# Patient Record
Sex: FEMALE | Race: WHITE | NOT HISPANIC OR LATINO | Employment: FULL TIME | URBAN - METROPOLITAN AREA
[De-identification: names, ages, dates, MRNs, and addresses within clinical notes are randomized per-mention and may not be internally consistent; named-entity substitution may affect disease eponyms.]

---

## 2017-10-10 ENCOUNTER — OFFICE VISIT (OUTPATIENT)
Dept: URGENT CARE | Facility: CLINIC | Age: 37
End: 2017-10-10
Payer: COMMERCIAL

## 2017-10-10 VITALS
DIASTOLIC BLOOD PRESSURE: 98 MMHG | SYSTOLIC BLOOD PRESSURE: 151 MMHG | OXYGEN SATURATION: 99 % | WEIGHT: 230 LBS | HEART RATE: 80 BPM | BODY MASS INDEX: 46.37 KG/M2 | TEMPERATURE: 97 F | HEIGHT: 59 IN

## 2017-10-10 DIAGNOSIS — H92.03 OTALGIA OF BOTH EARS: Primary | ICD-10-CM

## 2017-10-10 PROCEDURE — 99203 OFFICE O/P NEW LOW 30 MIN: CPT | Mod: S$GLB,,, | Performed by: EMERGENCY MEDICINE

## 2017-10-10 RX ORDER — ACETAMINOPHEN AND CODEINE PHOSPHATE 120; 12 MG/5ML; MG/5ML
1 SOLUTION ORAL DAILY
COMMUNITY

## 2017-10-10 NOTE — PROGRESS NOTES
"Subjective:       Patient ID: Malika Johnson is a 37 y.o. female.    Vitals:  height is 4' 11" (1.499 m) and weight is 104.3 kg (230 lb). Her oral temperature is 97.1 °F (36.2 °C). Her blood pressure is 151/98 (abnormal) and her pulse is 80. Her oxygen saturation is 99%.     Chief Complaint: Otalgia and Cough    Otalgia    There is pain in both ears. This is a new problem. The current episode started in the past 7 days. The problem occurs constantly. The problem has been waxing and waning. There has been no fever. Associated symptoms include coughing. Pertinent negatives include no abdominal pain, headaches or sore throat.   Cough   This is a new problem. The current episode started today. The problem has been unchanged. The problem occurs constantly. Associated symptoms include ear pain. Pertinent negatives include no chest pain, chills, eye redness, fever, headaches, myalgias, sore throat, shortness of breath or wheezing.     Review of Systems   Constitution: Negative for chills, fever and malaise/fatigue.   HENT: Positive for ear pain. Negative for congestion, hoarse voice and sore throat.    Eyes: Negative for discharge and redness.   Cardiovascular: Negative for chest pain, dyspnea on exertion and leg swelling.   Respiratory: Positive for cough. Negative for shortness of breath, sputum production and wheezing.    Musculoskeletal: Negative for myalgias.   Gastrointestinal: Negative for abdominal pain and nausea.   Neurological: Negative for headaches.       Objective:      Physical Exam   Constitutional: She is oriented to person, place, and time. She appears well-developed and well-nourished. She is cooperative.  Non-toxic appearance. She does not appear ill. No distress.   HENT:   Head: Normocephalic and atraumatic.   Right Ear: Hearing, tympanic membrane, external ear and ear canal normal.   Left Ear: Hearing, tympanic membrane, external ear and ear canal normal.   Nose: Nose normal. No mucosal edema, " rhinorrhea or nasal deformity. No epistaxis. Right sinus exhibits no maxillary sinus tenderness and no frontal sinus tenderness. Left sinus exhibits no maxillary sinus tenderness and no frontal sinus tenderness.   Mouth/Throat: Uvula is midline, oropharynx is clear and moist and mucous membranes are normal. No trismus in the jaw. Normal dentition. No uvula swelling. No posterior oropharyngeal erythema.   Eyes: Conjunctivae and lids are normal. No scleral icterus.   Sclera clear bilat   Neck: Trachea normal, full passive range of motion without pain and phonation normal. Neck supple.   Cardiovascular: Normal rate, regular rhythm, normal heart sounds, intact distal pulses and normal pulses.    Pulmonary/Chest: Effort normal and breath sounds normal. No respiratory distress.   Abdominal: Soft. Normal appearance and bowel sounds are normal. She exhibits no distension. There is no tenderness.   Musculoskeletal: Normal range of motion. She exhibits no edema or deformity.   Neurological: She is alert and oriented to person, place, and time. She exhibits normal muscle tone. Coordination normal.   Skin: Skin is warm, dry and intact. She is not diaphoretic. No pallor.   Psychiatric: She has a normal mood and affect. Her speech is normal and behavior is normal. Judgment and thought content normal. Cognition and memory are normal.   Nursing note and vitals reviewed.      Assessment:       1. Otalgia of both ears        Plan:         Otalgia of both ears

## 2017-10-10 NOTE — PATIENT INSTRUCTIONS
Please drink plenty of fluids.  Please get plenty of rest.  Please return here or go to the Emergency Department for any concerns or worsening of condition.  If you were given wait & see antibiotics, please wait 3-5 days before taking them, and only take them if your symptoms have worsened or not improved.  If you do begin taking the antibiotics, please take them to completion.  If you were prescribed antibiotics, please take them to completion.  If you were prescribed a narcotic medication, do not drive or operate heavy equipment or machinery while taking these medications.  Please taek over the counter Afrin as directed.  If you do not have Hypertension or any history of palpitations, it is ok to take over the counter Sudafed or Mucinex D or Allegra-D or Claritin-D or Zyrtec-D.  If you do take one of the above, it is ok to combine that with plain over the counter Mucinex or Allegra or Claritin or Zyrtec.  If for example you are taking Zyrtec -D, you can combine that with Mucinex, but not Mucinex-D.  If you are taking Mucinex-D, you can combine that with plain Allegra or Claritin or Zyrtec.   If you do have Hypertension or palpitations, it is safe to take Coricidin HBP for relief of sinus symptoms.  We recommend you take over the counter Flonase (Fluticasone) or another nasally inhaled steroid unless you are already taking one.  Nasal irrigation with a saline spray or Netti Pot like device per their directions is also recommended.  If not allergic, please take over the counter Tylenol (Acetaminophen) and/or Motrin (Ibuprofen) as directed for control of pain and/or fever.  Please follow up with your primary care doctor or specialist as needed.    If you  smoke, please stop smoking.  Earache, No Infection (Adult)  Earaches can happen without an infection. This occurs when air and fluid build up behind the eardrum causing a feeling of fullness and discomfort and reduced hearing. This is called otitis media with  effusion (OME) or serous otitis media. It means there is fluid in the middle ear. It is not the same as acute otitis media, which is typically from infection.  OME can happen when you have a cold if congestion blocks the passage that drains the middle ear. This passage is called the eustachian tube. OME may also occur with nasal allergies or after a bacterial middle ear infection.    The pain or discomfort may come and go. You may hear clicking or popping sounds when you chew or swallow. You may feel that your balance is off. Or you may hear ringing in the ear.  It often takes from several weeks up to 3 months for the fluid to clear on its own. Oral pain relievers and ear drops help if there is pain. Decongestants and antihistamines sometimes help. Antibiotics don't help since there is no infection. Your doctor may prescribe a nasal spray to help reduce swelling in the nose and eustachian tube. This can allow the ear to drain.  If your OME doesn't improve after 3 months, surgery may be used to drain the fluid and insert a small tube in the eardrum to allow continued drainage.  Because the middle ear fluid can become infected, it is important to watch for signs of an ear infection which may develop later. These signs include increased ear pain, fever, or drainage from the ear.  Home care  The following guidelines will help you care for yourself at home:  · You may use over-the-counter medicine as directed to control pain, unless another medicine was prescribed. If you have chronic liver or kidney disease or ever had a stomach ulcer or GI bleeding, talk with your doctor before using these medicines. Aspirin should never be used in anyone under 18 years of age who is ill with a fever. It may cause severe liver damage.  · You may use over-the-counter decongestants such as phenylephrine or pseudoephedrine. But they are not always helpful. Don't use nasal spray decongestants more than 3 days. Longer use can make  congestion worse. Prescription nasal sprays from your doctor don't typically have those restrictions.  · Antihistamines may help if you are also having allergy symptoms.  · You may use medicines such as guaifenesin to thin mucus and promote drainage.  Follow-up care  Follow up with your healthcare provider or as advised if you are not feeling better after 3 days.  When to seek medical advice  Call your healthcare provider right away if any of the following occur:  · Your ear pain gets worse or does not start to improve   · Fever of 100.4°F (38°C) or higher, or as directed by your healthcare provider  · Fluid or blood draining from the ear  · Headache or sinus pain  · Stiff neck  · Unusual drowsiness or confusion  Date Last Reviewed: 10/1/2016  © 9622-4963 [email or web address]. [address] [place] [address], [place] [address]. All rights reserved. This information is not intended as a substitute for professional medical care. Always follow your healthcare professional's instructions.

## 2018-03-28 ENCOUNTER — OFFICE VISIT (OUTPATIENT)
Dept: URGENT CARE | Facility: CLINIC | Age: 38
End: 2018-03-28
Payer: COMMERCIAL

## 2018-03-28 VITALS
WEIGHT: 230 LBS | DIASTOLIC BLOOD PRESSURE: 89 MMHG | HEIGHT: 59 IN | SYSTOLIC BLOOD PRESSURE: 138 MMHG | BODY MASS INDEX: 46.37 KG/M2 | HEART RATE: 79 BPM | RESPIRATION RATE: 16 BRPM | TEMPERATURE: 99 F | OXYGEN SATURATION: 97 %

## 2018-03-28 DIAGNOSIS — R19.7 DIARRHEA, UNSPECIFIED TYPE: Primary | ICD-10-CM

## 2018-03-28 PROCEDURE — 99214 OFFICE O/P EST MOD 30 MIN: CPT | Mod: S$GLB,,, | Performed by: FAMILY MEDICINE

## 2018-03-28 RX ORDER — DICYCLOMINE HYDROCHLORIDE 20 MG/1
20 TABLET ORAL
Qty: 20 TABLET | Refills: 0 | Status: SHIPPED | OUTPATIENT
Start: 2018-03-28 | End: 2018-04-02

## 2018-03-28 NOTE — LETTER
March 28, 2018      [facility] [address] [place] [address]  Phone: [phone]  Fax: [phone]       Patient: Malika Johnson   YOB: 1980  Date of Visit: 03/28/2018    To Whom It May Concern:    Milena Johnson  was at [facility] on 03/28/2018. She may return to work/school on 03/30/2018 with no restrictions. If you have any questions or concerns, or if I can be of further assistance, please do not hesitate to contact me.    Sincerely,      Bakari Singh MD

## 2018-03-28 NOTE — PROGRESS NOTES
"Subjective:       Patient ID: Malika Johnson is a 37 y.o. female.    Vitals:  height is 4' 11" (1.499 m) and weight is 104.3 kg (230 lb). Her temperature is 98.7 °F (37.1 °C). Her blood pressure is 138/89 and her pulse is 79. Her respiration is 16 and oxygen saturation is 97%.     Chief Complaint: Diarrhea    Diarrhea    The current episode started 1 to 4 weeks ago. The problem has been waxing and waning. The stool consistency is described as blood tinged and mucous. Pertinent negatives include no abdominal pain, chills or fever.     Review of Systems   Constitution: Negative for chills and fever.   Cardiovascular: Negative for chest pain.   Respiratory: Negative for shortness of breath.    Musculoskeletal: Negative for back pain.   Gastrointestinal: Positive for diarrhea and nausea. Negative for abdominal pain, constipation, hematochezia and melena.   Genitourinary: Negative for dysuria.       Objective:      Physical Exam   Constitutional: She is oriented to person, place, and time. She appears well-developed.   HENT:   Head: Normocephalic.   Nose: Nose normal.   Mouth/Throat: Oropharynx is clear and moist.   Eyes: Conjunctivae and EOM are normal. Pupils are equal, round, and reactive to light.   Cardiovascular: Normal rate and regular rhythm.    Pulmonary/Chest: Breath sounds normal.   Abdominal: Bowel sounds are increased. There is tenderness in the right lower quadrant, suprapubic area and left lower quadrant. There is no rigidity, no rebound, no guarding, no CVA tenderness, no tenderness at McBurney's point and negative Chau's sign.   Neurological: She is alert and oriented to person, place, and time.       Assessment:       1. Diarrhea, unspecified type        Plan:         Diarrhea, unspecified type  -     Stool culture; Future; Expected date: 03/28/2018  -     Occult blood x 1, stool  -     Stool Exam-Ova,Cysts,Parasites  -     WBC, Stool  -     Clostridium difficile EIA  -     dicyclomine (BENTYL) 20 " mg tablet; Take 1 tablet (20 mg total) by mouth 4 (four) times daily before meals and nightly.  Dispense: 20 tablet; Refill: 0      Patient Instructions     Uncertain Causes of Diarrhea (Adult)    Diarrhea is when stools are loose and watery. This can be caused by:  · Viral infections  · Bacterial infections  · Food poisoning  · Parasites  · Irritable bowel syndrome (IBS)  · Inflammatory bowel diseases such as ulcerative colitis, Crohn's disease, and celiac disease  · Food intolerance, such as to lactose, the sugar found in milk and milk products  · Reaction to medicines like antibiotics, laxatives, cancer drugs, and antacids  Along with diarrhea, you may also have:  · Abdominal pain and cramping  · Nausea and vomiting  · Loss of bowel control  · Fever and chills  · Bloody stools  In some cases, antibiotics may help to treat diarrhea. You may have a stool sample test. This is done to see what is causing your diarrhea, and if antibiotics will help treat it. The results of a stool sample test may take up to 2 days. The healthcare provider may not give you antibiotics until he or she has the stool test results.  Diarrhea can cause dehydration. This is the loss of too much water and other fluids from the body. When this occurs, body fluid must be replaced. This can be done with oral rehydration solutions. Oral rehydration solutions are available at drugstores and grocery stores without a prescription.  Home care  Follow all instructions given by your healthcare provider. Rest at home for the next 24 hours, or until you feel better. Avoid caffeine, tobacco, and alcohol. These can make diarrhea, cramping, and pain worse.  If taking medicines:  · Dont take over-the-counter diarrhea or nausea medicines unless your healthcare provider tells you to.  · You may use acetaminophen or NSAID medicines like ibuprofen or naproxen to reduce pain and fever. Dont use these if you have chronic liver or kidney disease, or ever had a  stomach ulcer or gastrointestinal bleeding. Don't use NSAID medicines if you are already taking one for another condition (like arthritis) or are on daily aspirin therapy (such as for heart disease or after a stroke). Talk with your healthcare provider first.  · If antibiotics were prescribed, be sure you take them until they are finished. Dont stop taking them even when you feel better. Antibiotics must be taken as a full course.  To prevent the spread of illness:  · Remember that washing with soap and water and using alcohol-based  is the best way to prevent the spread of infection.  · Clean the toilet after each use.  · Wash your hands before eating.  · Wash your hands before and after preparing food. Keep in mind that people with diarrhea or vomiting should not prepare food for others.  · Wash your hands after using cutting boards, countertops, and knives that have been in contact with raw foods.  · Wash and then peel fruits and vegetables.  · Keep uncooked meats away from cooked and ready-to-eat foods.  · Use a food thermometer when cooking. Cook poultry to at least 165°F (74°C). Cook ground meat (beef, veal, pork, lamb) to at least 160°F (71°C). Cook fresh beef, veal, lamb, and pork to at least 145°F (63°C).  · Dont eat raw or undercooked eggs (poached or chasidy side up), poultry, meat, or unpasteurized milk and juices.  Food and drinks  The main goal while treating vomiting or diarrhea is to prevent dehydration. This is done by taking small amounts of liquids often.  · Keep in mind that liquids are more important than food right now.  · Drink only small amounts of liquids at a time.  · Dont force yourself to eat, especially if you are having cramping, vomiting, or diarrhea. Dont eat large amounts at a time, even if you are hungry.  · If you eat, avoid fatty, greasy, spicy, or fried foods.  · Dont eat dairy foods or drink milk if you have diarrhea. These can make diarrhea worse.  During the first  24 hours you can try:  · Oral rehydration solutions. Do not use sports drinks. They have too much sugar and not enough electrolytes.  · Soft drinks without caffeine  · Ginger ale  · Water (plain or flavored)  · Decaf tea or coffee  · Clear broth, consommé, or bouillon  · Gelatin, popsicles, or frozen fruit juice bars  The second 24 hours, if you are feeling better, you can add:  · Hot cereal, plain toast, bread, rolls, or crackers  · Plain noodles, rice, mashed potatoes, chicken noodle soup, or rice soup  · Unsweetened canned fruit (no pineapple)  · Bananas  As you recover:  · Limit fat intake to less than 15 grams per day. Dont eat margarine, butter, oils, mayonnaise, sauces, gravies, fried foods, peanut butter, meat, poultry, or fish.  · Limit fiber. Dont eat raw or cooked vegetables, fresh fruits except bananas, or bran cereals.  · Limit caffeine and chocolate.  · Limit dairy.  · Dont use spices or seasonings except salt.  · Go back to your normal diet over time, as you feel better and your symptoms improve.  · If the symptoms come back, go back to a simple diet or clear liquids.  Follow-up care  Follow up with your healthcare provider, or as advised. If a stool sample was taken or cultures were done, call the healthcare provider for the results as instructed.  Call 911  Call 911 if you have any of these symptoms:  · Trouble breathing  · Confusion  · Extreme drowsiness or trouble walking  · Loss of consciousness  · Rapid heart rate  · Chest pain  · Stiff neck  · Seizure  When to seek medical advice  Call your healthcare provider right away if any of these occur:  · Abdominal pain that gets worse  · Constant lower right abdominal pain  · Continued vomiting and inability to keep liquids down  · Diarrhea more than 5 times a day  · Blood in vomit or stool  · Dark urine or no urine for 8 hours, dry mouth and tongue, tiredness, weakness, or dizziness  · Drowsiness  · New rash  · You dont get better in 2 to 3  days  · Fever of 100.4°F (38°C) or higher that doesnt get lower with medicine  Date Last Reviewed: 1/3/2016  © 5068-6335 [employer]. [address] [place] [address], [place] [address]. All rights reserved. This information is not intended as a substitute for professional medical care. Always follow your healthcare professional's instructions.

## 2018-03-28 NOTE — PATIENT INSTRUCTIONS

## 2018-04-02 LAB
BACTERIA SPEC CULT: NORMAL
BACTERIA SPEC CULT: NORMAL
CAMPYLOBACTER STL CULT: NORMAL
E COLI SXT STL QL IA: NEGATIVE
SALM + SHIG STL CULT: NORMAL

## 2018-04-04 ENCOUNTER — TELEPHONE (OUTPATIENT)
Dept: URGENT CARE | Facility: CLINIC | Age: 38
End: 2018-04-04

## 2018-04-04 LAB
C DIFF TOX GENS STL QL NAA+PROBE: NEGATIVE
HEMOCCULT STL QL IA: NEGATIVE
O+P SPEC MICRO: NORMAL
O+P STL CONC: NORMAL
WBC STL QL MICRO: NORMAL
WBC STL QL MICRO: NORMAL

## 2018-04-04 NOTE — TELEPHONE ENCOUNTER
Patient results discussed with patient. She reports understanding. Advised follow up with PCP if needed.    ----- Message from Bakari Singh MD sent at 4/4/2018  9:01 AM CDT -----  Please notify patient that labs were within normal limits with no significant abnormalities on stool culture. Advise patient to follow up with PCP (or specialist)  as directed if symptoms persist.

## 2018-04-05 ENCOUNTER — TELEPHONE (OUTPATIENT)
Dept: URGENT CARE | Facility: CLINIC | Age: 38
End: 2018-04-05

## 2018-08-19 ENCOUNTER — HOSPITAL ENCOUNTER (EMERGENCY)
Facility: HOSPITAL | Age: 38
Discharge: HOME | End: 2018-08-19
Attending: EMERGENCY MEDICINE
Payer: COMMERCIAL

## 2018-08-19 VITALS
HEIGHT: 59 IN | RESPIRATION RATE: 16 BRPM | SYSTOLIC BLOOD PRESSURE: 145 MMHG | TEMPERATURE: 99 F | BODY MASS INDEX: 46.37 KG/M2 | OXYGEN SATURATION: 97 % | HEART RATE: 85 BPM | DIASTOLIC BLOOD PRESSURE: 97 MMHG | WEIGHT: 230 LBS

## 2018-08-19 DIAGNOSIS — K62.5 RECTAL BLEEDING: Primary | ICD-10-CM

## 2018-08-19 LAB
ALBUMIN SERPL-MCNC: 3.3 G/DL (ref 3.3–5.5)
ALP SERPL-CCNC: 78 U/L (ref 42–141)
B-HCG UR QL: NEGATIVE
BILIRUB SERPL-MCNC: 0.5 MG/DL (ref 0.2–1.6)
BILIRUBIN, POC UA: NEGATIVE
BLOOD, POC UA: ABNORMAL
BUN SERPL-MCNC: 11 MG/DL (ref 7–22)
CALCIUM SERPL-MCNC: 8.8 MG/DL (ref 8–10.3)
CHLORIDE SERPL-SCNC: 105 MMOL/L (ref 98–108)
CLARITY, POC UA: CLEAR
COLOR, POC UA: YELLOW
CREAT SERPL-MCNC: 0.6 MG/DL (ref 0.6–1.2)
CTP QC/QA: YES
CTP QC/QA: YES
FECAL OCCULT BLOOD, POC: NEGATIVE
GLUCOSE SERPL-MCNC: 127 MG/DL (ref 73–118)
GLUCOSE, POC UA: NEGATIVE
KETONES, POC UA: NEGATIVE
LEUKOCYTE EST, POC UA: NEGATIVE
NITRITE, POC UA: NEGATIVE
PH UR STRIP: 6 [PH]
POC ALT (SGPT): 40 U/L (ref 10–47)
POC AST (SGOT): 32 U/L (ref 11–38)
POC TCO2: 25 MMOL/L (ref 18–33)
POTASSIUM BLD-SCNC: 3.5 MMOL/L (ref 3.6–5.1)
PROTEIN, POC UA: NEGATIVE
PROTEIN, POC: 7.5 G/DL (ref 6.4–8.1)
SODIUM BLD-SCNC: 142 MMOL/L (ref 128–145)
SPECIFIC GRAVITY, POC UA: 1.01
UROBILINOGEN, POC UA: 0.2 E.U./DL

## 2018-08-19 PROCEDURE — 99283 EMERGENCY DEPT VISIT LOW MDM: CPT | Mod: 25

## 2018-08-19 PROCEDURE — 81025 URINE PREGNANCY TEST: CPT | Performed by: EMERGENCY MEDICINE

## 2018-08-19 PROCEDURE — 80053 COMPREHEN METABOLIC PANEL: CPT

## 2018-08-19 PROCEDURE — 85025 COMPLETE CBC W/AUTO DIFF WBC: CPT

## 2018-08-19 PROCEDURE — 81003 URINALYSIS AUTO W/O SCOPE: CPT

## 2018-08-19 PROCEDURE — 82270 OCCULT BLOOD FECES: CPT

## 2018-08-19 NOTE — DISCHARGE INSTRUCTIONS
Contact  the [facility] referral line at [phone] for help establishing a primary care provider for health maintenance and follow-up.  You will likely need to be evaluated by a gastroenterologist with a endoscopic procedure.    Drink plenty of liquids to keep stool soft.    Rash the next 1-2 days as needed.    Continue to eat a high-fiber diet.    Return as needed for worsening condition.

## 2018-08-19 NOTE — ED PROVIDER NOTES
Encounter Date: 2018       History     Chief Complaint   Patient presents with    Rectal Bleeding     pt reports she noticed bright red blood in stool this morning. pt denies abdominal pain, N/V/D.      38 year old female reports she had bright red blood per rectum this morning.  She also reports she also had an episode Th.            Review of patient's allergies indicates:   Allergen Reactions    Amoxicillin      Past Medical History:   Diagnosis Date    Graves disease     Mastitis     left breast     Past Surgical History:   Procedure Laterality Date    APPENDECTOMY      BREAST SURGERY  2003    left breast     SECTION      x1    THYROIDECTOMY       History reviewed. No pertinent family history.  Social History     Tobacco Use    Smoking status: Never Smoker    Smokeless tobacco: Never Used   Substance Use Topics    Alcohol use: Yes    Drug use: No     Review of Systems   Constitutional: Positive for fatigue. Negative for chills and fever.   HENT: Negative for rhinorrhea and sore throat.    Respiratory: Negative for cough and shortness of breath.    Cardiovascular: Negative for chest pain and leg swelling.   Gastrointestinal: Positive for blood in stool, diarrhea (three episodes Thursday) and nausea. Negative for abdominal pain, rectal pain and vomiting.   Genitourinary: Positive for hematuria. Negative for dysuria and frequency.   Musculoskeletal: Negative for back pain and neck pain.   Skin: Negative for rash and wound.   Neurological: Negative for weakness and numbness (  ).       Physical Exam     Initial Vitals [18 1321]   BP Pulse Resp Temp SpO2   (!) 140/82 102 18 98.6 °F (37 °C) 98 %      MAP       --         Physical Exam    Nursing note and vitals reviewed.  Constitutional: Vital signs are normal. She appears well-developed and well-nourished. She is cooperative.   HENT:   Head: Normocephalic and atraumatic.   Neck: Phonation normal. Neck supple. No spinous process  tenderness present.   Cardiovascular: Normal rate, regular rhythm and normal heart sounds.   Pulses:       Radial pulses are 2+ on the right side, and 2+ on the left side.   No pedal edema bilateral lower extremities   Pulmonary/Chest: Effort normal and breath sounds normal.   Abdominal: Soft. Bowel sounds are normal. There is no tenderness. There is no guarding.   Genitourinary: Rectum normal. Rectal exam shows no external hemorrhoid, no fissure, no tenderness and guaiac negative stool. Guaiac negative stool. Pelvic exam was performed with patient in the knee-chest position.   Neurological: She is alert and oriented to person, place, and time. Gait normal.         ED Course   Procedures  Labs Reviewed   POCT URINALYSIS W/O SCOPE - Abnormal; Notable for the following components:       Result Value    Glucose, UA Negative (*)     Bilirubin, UA Negative (*)     Ketones, UA Negative (*)     Blood, UA Trace-lysed (*)     Protein, UA Negative (*)     Nitrite, UA Negative (*)     Leukocytes, UA Negative (*)     All other components within normal limits   POCT URINE PREGNANCY   POCT URINALYSIS W/O SCOPE   POCT CBC   POCT CMP          Imaging Results    None                               Clinical Impression:   The encounter diagnosis was Rectal bleeding.                            Loida Curry MD  08/19/18 [id number]

## 2018-08-19 NOTE — ED NOTES
Pt states she was on her period but finished and then she noticed bright red blood when she wiped herself and thought it was still her period but the blood was coming from her rectum. Pt stated there was more blood today then yesterday. She states she does note have a problem with hemorrhoids and has never had rectal bleeding.

## 2018-08-20 ENCOUNTER — TELEPHONE (OUTPATIENT)
Dept: SURGERY | Facility: CLINIC | Age: 38
End: 2018-08-20

## 2018-08-20 NOTE — TELEPHONE ENCOUNTER
----- Message from Marguerite Tsang sent at 8/20/2018  8:39 AM CDT -----  Contact: self  Pt is calling in regards to scheduling a new pt appt. Per pt, she had blood in her stool. The first available appt is 10/22. Pt would like to be seen sooner.    Pt would like a call back at [phone].    Thank you

## 2018-08-21 ENCOUNTER — OFFICE VISIT (OUTPATIENT)
Dept: SURGERY | Facility: CLINIC | Age: 38
End: 2018-08-21
Payer: COMMERCIAL

## 2018-08-21 VITALS
BODY MASS INDEX: 50.08 KG/M2 | HEART RATE: 85 BPM | DIASTOLIC BLOOD PRESSURE: 90 MMHG | SYSTOLIC BLOOD PRESSURE: 142 MMHG | WEIGHT: 248.44 LBS | HEIGHT: 59 IN

## 2018-08-21 DIAGNOSIS — K64.8 BLEEDING INTERNAL HEMORRHOIDS: Primary | ICD-10-CM

## 2018-08-21 PROCEDURE — 99999 PR PBB SHADOW E&M-EST. PATIENT-LVL III: CPT | Mod: PBBFAC,,, | Performed by: NURSE PRACTITIONER

## 2018-08-21 PROCEDURE — 99204 OFFICE O/P NEW MOD 45 MIN: CPT | Mod: 25,S$GLB,, | Performed by: NURSE PRACTITIONER

## 2018-08-21 PROCEDURE — 3008F BODY MASS INDEX DOCD: CPT | Mod: CPTII,S$GLB,, | Performed by: NURSE PRACTITIONER

## 2018-08-21 PROCEDURE — 46600 DIAGNOSTIC ANOSCOPY SPX: CPT | Mod: S$GLB,,, | Performed by: NURSE PRACTITIONER

## 2018-08-21 RX ORDER — HYDROCORTISONE ACETATE 25 MG/1
25 SUPPOSITORY RECTAL 2 TIMES DAILY
Qty: 20 SUPPOSITORY | Refills: 0 | Status: SHIPPED | OUTPATIENT
Start: 2018-08-21 | End: 2018-08-31

## 2018-08-21 NOTE — PROGRESS NOTES
Subjective:       Patient ID: Malika Johnson is a 38 y.o. female.    Chief Complaint: Rectal Bleeding    HPI   38 F presents to clinic for rectal bleeding for the past 5 days. Bright red blood with BMs, in toilet bowel and on toilet paper. She denies any rectal pain, abdominal pain, unexplained weight loss or change in bowel habits. She has a soft BM 1-2x/day.     Prior colonoscopy: none  Family history of colon or rectal CA: none  Prior rectal surgeries: none    Review of Systems   Constitutional: Negative for fatigue, fever and unexpected weight change.   Respiratory: Negative for shortness of breath.    Cardiovascular: Negative for chest pain.   Gastrointestinal: Positive for blood in stool. Negative for abdominal distention, abdominal pain, anal bleeding, constipation, diarrhea, nausea, rectal pain and vomiting.       Objective:      Physical Exam   Constitutional: She is oriented to person, place, and time. She appears well-developed and well-nourished. No distress.   Eyes: Conjunctivae and EOM are normal.   Pulmonary/Chest: Effort normal. No respiratory distress.   Abdominal: Soft. She exhibits no distension. There is no tenderness.   Genitourinary:   Genitourinary Comments: Normal perianal skin. eversion of anus revealed no abnormality or fissure, CHARAN revealed no masses, blood or stool in vault, normal sphincter tone, anoscopy revealed Grade III internal hemorrhoids with a small amount of bleeding     Musculoskeletal: Normal range of motion.   Neurological: She is alert and oriented to person, place, and time.   Skin: Skin is warm and dry.   Psychiatric: She has a normal mood and affect. Her behavior is normal.       Assessment:       1. Bleeding internal hemorrhoids        Plan:       Anusuol suppositories  High fiber diet   Metamucil  RTC 4 weeks

## 2020-06-17 ENCOUNTER — PATIENT OUTREACH (OUTPATIENT)
Dept: ADMINISTRATIVE | Facility: HOSPITAL | Age: 40
End: 2020-06-17

## 2020-07-01 ENCOUNTER — OFFICE VISIT (OUTPATIENT)
Dept: FAMILY MEDICINE | Facility: CLINIC | Age: 40
End: 2020-07-01
Payer: COMMERCIAL

## 2020-07-01 VITALS
TEMPERATURE: 97 F | HEIGHT: 59 IN | SYSTOLIC BLOOD PRESSURE: 136 MMHG | WEIGHT: 262.25 LBS | OXYGEN SATURATION: 98 % | BODY MASS INDEX: 52.87 KG/M2 | HEART RATE: 104 BPM | DIASTOLIC BLOOD PRESSURE: 86 MMHG

## 2020-07-01 DIAGNOSIS — E05.00 GRAVES DISEASE: ICD-10-CM

## 2020-07-01 DIAGNOSIS — Z00.00 ROUTINE GENERAL MEDICAL EXAMINATION AT A HEALTH CARE FACILITY: Primary | ICD-10-CM

## 2020-07-01 PROCEDURE — 99385 PR PREVENTIVE VISIT,NEW,18-39: ICD-10-PCS | Mod: S$GLB,,, | Performed by: FAMILY MEDICINE

## 2020-07-01 PROCEDURE — 99999 PR PBB SHADOW E&M-EST. PATIENT-LVL III: CPT | Mod: PBBFAC,,, | Performed by: FAMILY MEDICINE

## 2020-07-01 PROCEDURE — 99999 PR PBB SHADOW E&M-EST. PATIENT-LVL III: ICD-10-PCS | Mod: PBBFAC,,, | Performed by: FAMILY MEDICINE

## 2020-07-01 PROCEDURE — 99385 PREV VISIT NEW AGE 18-39: CPT | Mod: S$GLB,,, | Performed by: FAMILY MEDICINE

## 2020-07-01 NOTE — PROGRESS NOTES
"Chief Complaint   Patient presents with    Establish Care    Annual Exam       HPI  Malika Johnson is a 39 y.o. female with multiple medical diagnoses as listed in the medical history and problem list that presents for establishment of care and for annual exam    She has concerns about her hx of graves disease for which her thyroid levels have been monitored but not recently. She is not having any symptoms besides some weight gain but attributes this to decreased exercise since the pandemic and increased appetite but she is trying to adjust this. Periods are regular on an OCP, has f/u with GYN for her pap smear    HPI    Patient Active Problem List   Diagnosis    Graves disease         ROS  Review of Systems   Constitutional: Negative for chills, diaphoresis, fatigue, fever and unexpected weight change.   HENT: Negative for rhinorrhea, sinus pressure, sore throat and tinnitus.    Eyes: Negative for photophobia and visual disturbance.   Respiratory: Negative for cough, shortness of breath and wheezing.    Cardiovascular: Negative for chest pain and palpitations.   Gastrointestinal: Negative for abdominal pain, blood in stool, constipation, diarrhea, nausea and vomiting.   Genitourinary: Negative for dysuria, flank pain, frequency and vaginal discharge.   Musculoskeletal: Negative for arthralgias and joint swelling.   Skin: Negative for rash.   Neurological: Negative for speech difficulty, weakness, light-headedness and headaches.   Psychiatric/Behavioral: Negative for behavioral problems and dysphoric mood.       Physical Exam  Vitals:    07/01/20 0821   BP: 136/86   BP Location: Right arm   Patient Position: Sitting   BP Method: Large (Manual)   Pulse: 104   Temp: 97.3 °F (36.3 °C)   TempSrc: Temporal   SpO2: 98%   Weight: 118.9 kg (262 lb 3.8 oz)   Height: 4' 11" (1.499 m)    Body mass index is 52.97 kg/m².  Weight: 118.9 kg (262 lb 3.8 oz)   Height: 4' 11" (149.9 cm)     Physical Exam  Vitals signs and " nursing note reviewed.   Constitutional:       General: She is not in acute distress.     Appearance: She is well-developed.   HENT:      Head: Normocephalic and atraumatic.      Right Ear: Tympanic membrane normal.      Left Ear: Tympanic membrane normal.      Nose: Nose normal.      Mouth/Throat:      Pharynx: No oropharyngeal exudate.   Neck:      Musculoskeletal: Neck supple.      Thyroid: No thyromegaly.   Cardiovascular:      Rate and Rhythm: Normal rate and regular rhythm.      Heart sounds: No murmur. No friction rub. No gallop.    Pulmonary:      Effort: Pulmonary effort is normal. No respiratory distress.      Breath sounds: Normal breath sounds. No wheezing or rales.   Abdominal:      General: Bowel sounds are normal. There is no distension.      Palpations: Abdomen is soft. There is no mass.      Tenderness: There is no abdominal tenderness. There is no guarding or rebound.   Lymphadenopathy:      Cervical: No cervical adenopathy.   Skin:     General: Skin is warm and dry.      Findings: No rash.   Neurological:      Mental Status: She is alert and oriented to person, place, and time.   Psychiatric:         Behavior: Behavior normal.         ALLERGIES AND MEDICATIONS: updated and reviewed.  Review of patient's allergies indicates:   Allergen Reactions    Amoxicillin Hives     Medication List with Changes/Refills   Current Medications    CALCIUM ORAL    Take by mouth.    FERROUS SULFATE (IRON ORAL)    Take by mouth.    MULTIVIT-MIN-FERROUS SULFATE 15 MG IRON TAB    Take by mouth.    MULTIVITAMIN ORAL    Take by mouth.    NORETHINDRONE (MICRONOR) 0.35 MG TABLET    Take 1 tablet by mouth once daily.       Assessment & Plan  1. Routine general medical examination at a health care facility    2. BMI 50.0-59.9, adult    3. Graves disease        Problem List Items Addressed This Visit        Endocrine    Graves disease  -will monitor thyroid levels      Other Visit Diagnoses     Routine general medical  examination at a health care facility    -  Primary  --discussed healthy lifestyle modification with exercise and healthy diet, reviewed age appropriate screening and healthy maintenance      Relevant Orders    CBC auto differential    Comprehensive metabolic panel    Lipid Panel    Hemoglobin A1C    TSH    T4, free    BMI 50.0-59.9, adult      -discussed increasing exercise and dietary modification for weight loss          Follow up in about 1 year (around 7/1/2021) for annual exam.    Other Orders Placed This Visit:  Orders Placed This Encounter   Procedures    CBC auto differential    Comprehensive metabolic panel    Lipid Panel    Hemoglobin A1C    TSH    T4, free

## 2020-07-02 ENCOUNTER — LAB VISIT (OUTPATIENT)
Dept: LAB | Facility: HOSPITAL | Age: 40
End: 2020-07-02
Attending: FAMILY MEDICINE
Payer: COMMERCIAL

## 2020-07-02 DIAGNOSIS — Z00.00 ROUTINE GENERAL MEDICAL EXAMINATION AT A HEALTH CARE FACILITY: ICD-10-CM

## 2020-07-02 LAB
ALBUMIN SERPL BCP-MCNC: 3.7 G/DL (ref 3.5–5.2)
ALP SERPL-CCNC: 87 U/L (ref 55–135)
ALT SERPL W/O P-5'-P-CCNC: 35 U/L (ref 10–44)
ANION GAP SERPL CALC-SCNC: 12 MMOL/L (ref 8–16)
AST SERPL-CCNC: 25 U/L (ref 10–40)
BASOPHILS # BLD AUTO: 0.08 K/UL (ref 0–0.2)
BASOPHILS NFR BLD: 0.7 % (ref 0–1.9)
BILIRUB SERPL-MCNC: 0.3 MG/DL (ref 0.1–1)
BUN SERPL-MCNC: 14 MG/DL (ref 6–20)
CALCIUM SERPL-MCNC: 9.1 MG/DL (ref 8.7–10.5)
CHLORIDE SERPL-SCNC: 104 MMOL/L (ref 95–110)
CHOLEST SERPL-MCNC: 176 MG/DL (ref 120–199)
CHOLEST/HDLC SERPL: 3.2 {RATIO} (ref 2–5)
CO2 SERPL-SCNC: 21 MMOL/L (ref 23–29)
CREAT SERPL-MCNC: 0.7 MG/DL (ref 0.5–1.4)
DIFFERENTIAL METHOD: ABNORMAL
EOSINOPHIL # BLD AUTO: 0.2 K/UL (ref 0–0.5)
EOSINOPHIL NFR BLD: 1.8 % (ref 0–8)
ERYTHROCYTE [DISTWIDTH] IN BLOOD BY AUTOMATED COUNT: 13.4 % (ref 11.5–14.5)
EST. GFR  (AFRICAN AMERICAN): >60 ML/MIN/1.73 M^2
EST. GFR  (NON AFRICAN AMERICAN): >60 ML/MIN/1.73 M^2
ESTIMATED AVG GLUCOSE: 108 MG/DL (ref 68–131)
GLUCOSE SERPL-MCNC: 96 MG/DL (ref 70–110)
HBA1C MFR BLD HPLC: 5.4 % (ref 4–5.6)
HCT VFR BLD AUTO: 40.2 % (ref 37–48.5)
HDLC SERPL-MCNC: 55 MG/DL (ref 40–75)
HDLC SERPL: 31.3 % (ref 20–50)
HGB BLD-MCNC: 12.5 G/DL (ref 12–16)
IMM GRANULOCYTES # BLD AUTO: 0.05 K/UL (ref 0–0.04)
IMM GRANULOCYTES NFR BLD AUTO: 0.4 % (ref 0–0.5)
LDLC SERPL CALC-MCNC: 88 MG/DL (ref 63–159)
LYMPHOCYTES # BLD AUTO: 3 K/UL (ref 1–4.8)
LYMPHOCYTES NFR BLD: 26.2 % (ref 18–48)
MCH RBC QN AUTO: 28.9 PG (ref 27–31)
MCHC RBC AUTO-ENTMCNC: 31.1 G/DL (ref 32–36)
MCV RBC AUTO: 93 FL (ref 82–98)
MONOCYTES # BLD AUTO: 0.9 K/UL (ref 0.3–1)
MONOCYTES NFR BLD: 7.4 % (ref 4–15)
NEUTROPHILS # BLD AUTO: 7.3 K/UL (ref 1.8–7.7)
NEUTROPHILS NFR BLD: 63.5 % (ref 38–73)
NONHDLC SERPL-MCNC: 121 MG/DL
NRBC BLD-RTO: 0 /100 WBC
PLATELET # BLD AUTO: 406 K/UL (ref 150–350)
PMV BLD AUTO: 10.7 FL (ref 9.2–12.9)
POTASSIUM SERPL-SCNC: 4.3 MMOL/L (ref 3.5–5.1)
PROT SERPL-MCNC: 7.9 G/DL (ref 6–8.4)
RBC # BLD AUTO: 4.33 M/UL (ref 4–5.4)
SODIUM SERPL-SCNC: 137 MMOL/L (ref 136–145)
T4 FREE SERPL-MCNC: 0.91 NG/DL (ref 0.71–1.51)
TRIGL SERPL-MCNC: 165 MG/DL (ref 30–150)
TSH SERPL DL<=0.005 MIU/L-ACNC: 0.56 UIU/ML (ref 0.4–4)
WBC # BLD AUTO: 11.42 K/UL (ref 3.9–12.7)

## 2020-07-02 PROCEDURE — 80061 LIPID PANEL: CPT

## 2020-07-02 PROCEDURE — 85025 COMPLETE CBC W/AUTO DIFF WBC: CPT

## 2020-07-02 PROCEDURE — 80053 COMPREHEN METABOLIC PANEL: CPT

## 2020-07-02 PROCEDURE — 84439 ASSAY OF FREE THYROXINE: CPT

## 2020-07-02 PROCEDURE — 36415 COLL VENOUS BLD VENIPUNCTURE: CPT | Mod: PO

## 2020-07-02 PROCEDURE — 83036 HEMOGLOBIN GLYCOSYLATED A1C: CPT

## 2020-07-02 PROCEDURE — 84443 ASSAY THYROID STIM HORMONE: CPT

## 2020-08-21 DIAGNOSIS — Z11.59 NEED FOR HEPATITIS C SCREENING TEST: ICD-10-CM

## 2020-08-21 DIAGNOSIS — Z12.39 BREAST CANCER SCREENING: ICD-10-CM

## 2021-01-04 ENCOUNTER — PATIENT MESSAGE (OUTPATIENT)
Dept: ADMINISTRATIVE | Facility: HOSPITAL | Age: 41
End: 2021-01-04

## 2021-01-15 ENCOUNTER — OCCUPATIONAL HEALTH (OUTPATIENT)
Dept: URGENT CARE | Facility: CLINIC | Age: 41
End: 2021-01-15
Payer: COMMERCIAL

## 2021-01-15 DIAGNOSIS — Z20.822 COVID-19 RULED OUT: Primary | ICD-10-CM

## 2021-01-15 LAB
CTP QC/QA: YES
SARS-COV-2 RDRP RESP QL NAA+PROBE: NEGATIVE

## 2021-01-15 PROCEDURE — 99199 UNLISTED SPECIAL SVC PX/RPRT: CPT | Mod: S$GLB,,, | Performed by: PHYSICIAN ASSISTANT

## 2021-01-15 PROCEDURE — 99199 CV19 SPECIMEN HANDLING FEE / SWAB: ICD-10-PCS | Mod: S$GLB,,, | Performed by: PHYSICIAN ASSISTANT

## 2021-01-15 PROCEDURE — U0002: ICD-10-PCS | Mod: QW,S$GLB,, | Performed by: PHYSICIAN ASSISTANT

## 2021-01-15 PROCEDURE — U0002 COVID-19 LAB TEST NON-CDC: HCPCS | Mod: QW,S$GLB,, | Performed by: PHYSICIAN ASSISTANT

## 2021-04-06 ENCOUNTER — PATIENT MESSAGE (OUTPATIENT)
Dept: ADMINISTRATIVE | Facility: HOSPITAL | Age: 41
End: 2021-04-06

## 2021-05-21 ENCOUNTER — PATIENT MESSAGE (OUTPATIENT)
Dept: OTOLARYNGOLOGY | Facility: CLINIC | Age: 41
End: 2021-05-21

## 2021-05-21 ENCOUNTER — OFFICE VISIT (OUTPATIENT)
Dept: OTOLARYNGOLOGY | Facility: CLINIC | Age: 41
End: 2021-05-21
Payer: COMMERCIAL

## 2021-05-21 VITALS
SYSTOLIC BLOOD PRESSURE: 130 MMHG | HEIGHT: 59 IN | BODY MASS INDEX: 52.76 KG/M2 | WEIGHT: 261.69 LBS | TEMPERATURE: 98 F | DIASTOLIC BLOOD PRESSURE: 82 MMHG

## 2021-05-21 DIAGNOSIS — J35.8 TONSILLITH: ICD-10-CM

## 2021-05-21 DIAGNOSIS — H60.543 ECZEMA OF EXTERNAL EAR, BILATERAL: ICD-10-CM

## 2021-05-21 DIAGNOSIS — R13.10 DYSPHAGIA, UNSPECIFIED TYPE: ICD-10-CM

## 2021-05-21 DIAGNOSIS — R05.3 CHRONIC COUGH: Primary | ICD-10-CM

## 2021-05-21 PROCEDURE — 3008F BODY MASS INDEX DOCD: CPT | Mod: CPTII,S$GLB,, | Performed by: NURSE PRACTITIONER

## 2021-05-21 PROCEDURE — 99204 OFFICE O/P NEW MOD 45 MIN: CPT | Mod: S$GLB,,, | Performed by: NURSE PRACTITIONER

## 2021-05-21 PROCEDURE — 99204 PR OFFICE/OUTPT VISIT, NEW, LEVL IV, 45-59 MIN: ICD-10-PCS | Mod: S$GLB,,, | Performed by: NURSE PRACTITIONER

## 2021-05-21 PROCEDURE — 3008F PR BODY MASS INDEX (BMI) DOCUMENTED: ICD-10-PCS | Mod: CPTII,S$GLB,, | Performed by: NURSE PRACTITIONER

## 2021-05-21 RX ORDER — BETAMETHASONE VALERATE 1.2 MG/G
CREAM TOPICAL 2 TIMES DAILY
Qty: 15 G | Refills: 0 | Status: SHIPPED | OUTPATIENT
Start: 2021-05-21

## 2021-05-21 RX ORDER — BETAMETHASONE VALERATE 0.1 %
LOTION (ML) TOPICAL 2 TIMES DAILY
Qty: 1 BOTTLE | Refills: 0 | Status: SHIPPED | OUTPATIENT
Start: 2021-05-21 | End: 2021-05-21 | Stop reason: ALTCHOICE

## 2021-06-25 ENCOUNTER — HOSPITAL ENCOUNTER (OUTPATIENT)
Dept: RADIOLOGY | Facility: HOSPITAL | Age: 41
Discharge: HOME | End: 2021-06-25
Attending: NURSE PRACTITIONER
Payer: COMMERCIAL

## 2021-06-25 DIAGNOSIS — R05.3 CHRONIC COUGH: ICD-10-CM

## 2021-06-25 PROCEDURE — 71046 X-RAY EXAM CHEST 2 VIEWS: CPT | Mod: 26,,, | Performed by: RADIOLOGY

## 2021-06-25 PROCEDURE — 71046 X-RAY EXAM CHEST 2 VIEWS: CPT | Mod: TC,FY

## 2021-06-25 PROCEDURE — 71046 XR CHEST PA AND LATERAL: ICD-10-PCS | Mod: 26,,, | Performed by: RADIOLOGY

## 2021-07-01 ENCOUNTER — OFFICE VISIT (OUTPATIENT)
Dept: OTOLARYNGOLOGY | Facility: CLINIC | Age: 41
End: 2021-07-01
Payer: COMMERCIAL

## 2021-07-01 VITALS — HEART RATE: 89 BPM | DIASTOLIC BLOOD PRESSURE: 82 MMHG | SYSTOLIC BLOOD PRESSURE: 135 MMHG

## 2021-07-01 DIAGNOSIS — J38.5 LARYNGEAL SPASM: ICD-10-CM

## 2021-07-01 DIAGNOSIS — J31.0 CHRONIC RHINITIS: ICD-10-CM

## 2021-07-01 DIAGNOSIS — R13.10 DYSPHAGIA, UNSPECIFIED TYPE: ICD-10-CM

## 2021-07-01 DIAGNOSIS — R05.3 CHRONIC COUGH: Primary | ICD-10-CM

## 2021-07-01 PROCEDURE — 99999 PR PBB SHADOW E&M-EST. PATIENT-LVL IV: CPT | Mod: PBBFAC,,, | Performed by: OTOLARYNGOLOGY

## 2021-07-01 PROCEDURE — 31579 LARYNGOSCOPY TELESCOPIC: CPT | Mod: S$GLB,,, | Performed by: OTOLARYNGOLOGY

## 2021-07-01 PROCEDURE — 99999 PR PBB SHADOW E&M-EST. PATIENT-LVL IV: ICD-10-PCS | Mod: PBBFAC,,, | Performed by: OTOLARYNGOLOGY

## 2021-07-01 PROCEDURE — 1126F PR PAIN SEVERITY QUANTIFIED, NO PAIN PRESENT: ICD-10-PCS | Mod: S$GLB,,, | Performed by: OTOLARYNGOLOGY

## 2021-07-01 PROCEDURE — 99214 OFFICE O/P EST MOD 30 MIN: CPT | Mod: 25,S$GLB,, | Performed by: OTOLARYNGOLOGY

## 2021-07-01 PROCEDURE — 31579 PR LARYNGOSCOPY, FLEX/RIGID TELESCOPIC, W/STROBOSCOPY: ICD-10-PCS | Mod: S$GLB,,, | Performed by: OTOLARYNGOLOGY

## 2021-07-01 PROCEDURE — 99214 PR OFFICE/OUTPT VISIT, EST, LEVL IV, 30-39 MIN: ICD-10-PCS | Mod: 25,S$GLB,, | Performed by: OTOLARYNGOLOGY

## 2021-07-01 PROCEDURE — 1126F AMNT PAIN NOTED NONE PRSNT: CPT | Mod: S$GLB,,, | Performed by: OTOLARYNGOLOGY

## 2021-07-06 ENCOUNTER — PATIENT MESSAGE (OUTPATIENT)
Dept: ADMINISTRATIVE | Facility: HOSPITAL | Age: 41
End: 2021-07-06

## 2021-07-22 ENCOUNTER — TELEPHONE (OUTPATIENT)
Dept: PULMONOLOGY | Facility: CLINIC | Age: 41
End: 2021-07-22

## 2021-07-22 ENCOUNTER — PATIENT OUTREACH (OUTPATIENT)
Dept: ADMINISTRATIVE | Facility: HOSPITAL | Age: 41
End: 2021-07-22

## 2021-07-22 DIAGNOSIS — R05.9 COUGH: Primary | ICD-10-CM

## 2021-07-23 ENCOUNTER — TELEPHONE (OUTPATIENT)
Dept: PULMONOLOGY | Facility: CLINIC | Age: 41
End: 2021-07-23

## 2021-07-23 ENCOUNTER — OCCUPATIONAL HEALTH (OUTPATIENT)
Dept: URGENT CARE | Facility: CLINIC | Age: 41
End: 2021-07-23
Payer: COMMERCIAL

## 2021-07-23 DIAGNOSIS — Z11.9 ENCOUNTER FOR SCREENING EXAMINATION FOR INFECTIOUS DISEASE: Primary | ICD-10-CM

## 2021-07-23 LAB
CTP QC/QA: YES
SARS-COV-2 RDRP RESP QL NAA+PROBE: NEGATIVE

## 2021-07-23 PROCEDURE — U0002 COVID-19 LAB TEST NON-CDC: HCPCS | Mod: QW,S$GLB,, | Performed by: EMERGENCY MEDICINE

## 2021-07-23 PROCEDURE — 99199 UNLISTED SPECIAL SVC PX/RPRT: CPT | Mod: S$GLB,,, | Performed by: EMERGENCY MEDICINE

## 2021-07-23 PROCEDURE — 99199 CV19 SPECIMEN HANDLING FEE / SWAB: ICD-10-PCS | Mod: S$GLB,,, | Performed by: EMERGENCY MEDICINE

## 2021-07-23 PROCEDURE — U0002: ICD-10-PCS | Mod: QW,S$GLB,, | Performed by: EMERGENCY MEDICINE

## 2021-07-26 ENCOUNTER — HOSPITAL ENCOUNTER (OUTPATIENT)
Dept: PULMONOLOGY | Facility: CLINIC | Age: 41
Discharge: HOME | End: 2021-07-26
Payer: COMMERCIAL

## 2021-07-26 ENCOUNTER — OFFICE VISIT (OUTPATIENT)
Dept: PULMONOLOGY | Facility: CLINIC | Age: 41
End: 2021-07-26
Payer: COMMERCIAL

## 2021-07-26 VITALS
HEIGHT: 59 IN | WEIGHT: 258.38 LBS | SYSTOLIC BLOOD PRESSURE: 140 MMHG | OXYGEN SATURATION: 96 % | HEART RATE: 97 BPM | BODY MASS INDEX: 52.09 KG/M2 | DIASTOLIC BLOOD PRESSURE: 82 MMHG

## 2021-07-26 DIAGNOSIS — R05.9 COUGH: ICD-10-CM

## 2021-07-26 PROCEDURE — 99999 PR PBB SHADOW E&M-EST. PATIENT-LVL III: CPT | Mod: PBBFAC,,, | Performed by: NURSE PRACTITIONER

## 2021-07-26 PROCEDURE — 94729 DIFFUSING CAPACITY: CPT | Mod: S$GLB,,, | Performed by: INTERNAL MEDICINE

## 2021-07-26 PROCEDURE — 1126F PR PAIN SEVERITY QUANTIFIED, NO PAIN PRESENT: ICD-10-PCS | Mod: CPTII,S$GLB,, | Performed by: NURSE PRACTITIONER

## 2021-07-26 PROCEDURE — 94060 EVALUATION OF WHEEZING: CPT | Mod: S$GLB,,, | Performed by: INTERNAL MEDICINE

## 2021-07-26 PROCEDURE — 1126F AMNT PAIN NOTED NONE PRSNT: CPT | Mod: CPTII,S$GLB,, | Performed by: NURSE PRACTITIONER

## 2021-07-26 PROCEDURE — 94727 PR PULM FUNCTION TEST BY GAS: ICD-10-PCS | Mod: S$GLB,,, | Performed by: INTERNAL MEDICINE

## 2021-07-26 PROCEDURE — 1160F PR REVIEW ALL MEDS BY PRESCRIBER/CLIN PHARMACIST DOCUMENTED: ICD-10-PCS | Mod: CPTII,S$GLB,, | Performed by: NURSE PRACTITIONER

## 2021-07-26 PROCEDURE — 3008F PR BODY MASS INDEX (BMI) DOCUMENTED: ICD-10-PCS | Mod: CPTII,S$GLB,, | Performed by: NURSE PRACTITIONER

## 2021-07-26 PROCEDURE — 1160F RVW MEDS BY RX/DR IN RCRD: CPT | Mod: CPTII,S$GLB,, | Performed by: NURSE PRACTITIONER

## 2021-07-26 PROCEDURE — 94729 PR C02/MEMBANE DIFFUSE CAPACITY: ICD-10-PCS | Mod: S$GLB,,, | Performed by: INTERNAL MEDICINE

## 2021-07-26 PROCEDURE — 94727 GAS DIL/WSHOT DETER LNG VOL: CPT | Mod: S$GLB,,, | Performed by: INTERNAL MEDICINE

## 2021-07-26 PROCEDURE — 94060 PR EVAL OF BRONCHOSPASM: ICD-10-PCS | Mod: S$GLB,,, | Performed by: INTERNAL MEDICINE

## 2021-07-26 PROCEDURE — 99204 OFFICE O/P NEW MOD 45 MIN: CPT | Mod: S$GLB,,, | Performed by: NURSE PRACTITIONER

## 2021-07-26 PROCEDURE — 3008F BODY MASS INDEX DOCD: CPT | Mod: CPTII,S$GLB,, | Performed by: NURSE PRACTITIONER

## 2021-07-26 PROCEDURE — 99999 PR PBB SHADOW E&M-EST. PATIENT-LVL III: ICD-10-PCS | Mod: PBBFAC,,, | Performed by: NURSE PRACTITIONER

## 2021-07-26 PROCEDURE — 1159F PR MEDICATION LIST DOCUMENTED IN MEDICAL RECORD: ICD-10-PCS | Mod: CPTII,S$GLB,, | Performed by: NURSE PRACTITIONER

## 2021-07-26 PROCEDURE — 99204 PR OFFICE/OUTPT VISIT, NEW, LEVL IV, 45-59 MIN: ICD-10-PCS | Mod: S$GLB,,, | Performed by: NURSE PRACTITIONER

## 2021-07-26 PROCEDURE — 1159F MED LIST DOCD IN RCRD: CPT | Mod: CPTII,S$GLB,, | Performed by: NURSE PRACTITIONER

## 2021-07-26 RX ORDER — LORATADINE 10 MG/1
10 TABLET ORAL
COMMUNITY
End: 2022-05-07

## 2021-08-02 ENCOUNTER — PATIENT MESSAGE (OUTPATIENT)
Dept: SPEECH THERAPY | Facility: HOSPITAL | Age: 41
End: 2021-08-02

## 2021-08-05 ENCOUNTER — OFFICE VISIT (OUTPATIENT)
Dept: FAMILY MEDICINE | Facility: CLINIC | Age: 41
End: 2021-08-05
Payer: COMMERCIAL

## 2021-08-05 ENCOUNTER — PATIENT OUTREACH (OUTPATIENT)
Dept: ADMINISTRATIVE | Facility: HOSPITAL | Age: 41
End: 2021-08-05

## 2021-08-05 VITALS
HEART RATE: 91 BPM | BODY MASS INDEX: 52.69 KG/M2 | OXYGEN SATURATION: 98 % | HEIGHT: 59 IN | DIASTOLIC BLOOD PRESSURE: 88 MMHG | TEMPERATURE: 98 F | WEIGHT: 261.38 LBS | SYSTOLIC BLOOD PRESSURE: 130 MMHG

## 2021-08-05 DIAGNOSIS — R05.9 COUGH: ICD-10-CM

## 2021-08-05 DIAGNOSIS — E05.00 GRAVES DISEASE: ICD-10-CM

## 2021-08-05 DIAGNOSIS — Z23 IMMUNIZATION DUE: Primary | ICD-10-CM

## 2021-08-05 DIAGNOSIS — Z00.00 ROUTINE GENERAL MEDICAL EXAMINATION AT A HEALTH CARE FACILITY: ICD-10-CM

## 2021-08-05 PROCEDURE — 3075F PR MOST RECENT SYSTOLIC BLOOD PRESS GE 130-139MM HG: ICD-10-PCS | Mod: CPTII,S$GLB,, | Performed by: FAMILY MEDICINE

## 2021-08-05 PROCEDURE — 90715 TDAP VACCINE GREATER THAN OR EQUAL TO 7YO IM: ICD-10-PCS | Mod: S$GLB,,, | Performed by: FAMILY MEDICINE

## 2021-08-05 PROCEDURE — 1159F PR MEDICATION LIST DOCUMENTED IN MEDICAL RECORD: ICD-10-PCS | Mod: CPTII,S$GLB,, | Performed by: FAMILY MEDICINE

## 2021-08-05 PROCEDURE — 3079F DIAST BP 80-89 MM HG: CPT | Mod: CPTII,S$GLB,, | Performed by: FAMILY MEDICINE

## 2021-08-05 PROCEDURE — 3079F PR MOST RECENT DIASTOLIC BLOOD PRESSURE 80-89 MM HG: ICD-10-PCS | Mod: CPTII,S$GLB,, | Performed by: FAMILY MEDICINE

## 2021-08-05 PROCEDURE — 90471 IMMUNIZATION ADMIN: CPT | Mod: S$GLB,,, | Performed by: FAMILY MEDICINE

## 2021-08-05 PROCEDURE — 90715 TDAP VACCINE 7 YRS/> IM: CPT | Mod: S$GLB,,, | Performed by: FAMILY MEDICINE

## 2021-08-05 PROCEDURE — 90471 TDAP VACCINE GREATER THAN OR EQUAL TO 7YO IM: ICD-10-PCS | Mod: S$GLB,,, | Performed by: FAMILY MEDICINE

## 2021-08-05 PROCEDURE — 3008F PR BODY MASS INDEX (BMI) DOCUMENTED: ICD-10-PCS | Mod: CPTII,S$GLB,, | Performed by: FAMILY MEDICINE

## 2021-08-05 PROCEDURE — 3075F SYST BP GE 130 - 139MM HG: CPT | Mod: CPTII,S$GLB,, | Performed by: FAMILY MEDICINE

## 2021-08-05 PROCEDURE — 1126F PR PAIN SEVERITY QUANTIFIED, NO PAIN PRESENT: ICD-10-PCS | Mod: CPTII,S$GLB,, | Performed by: FAMILY MEDICINE

## 2021-08-05 PROCEDURE — 99999 PR PBB SHADOW E&M-EST. PATIENT-LVL III: CPT | Mod: PBBFAC,,, | Performed by: FAMILY MEDICINE

## 2021-08-05 PROCEDURE — 3008F BODY MASS INDEX DOCD: CPT | Mod: CPTII,S$GLB,, | Performed by: FAMILY MEDICINE

## 2021-08-05 PROCEDURE — 99396 PREV VISIT EST AGE 40-64: CPT | Mod: 25,S$GLB,, | Performed by: FAMILY MEDICINE

## 2021-08-05 PROCEDURE — 99999 PR PBB SHADOW E&M-EST. PATIENT-LVL III: ICD-10-PCS | Mod: PBBFAC,,, | Performed by: FAMILY MEDICINE

## 2021-08-05 PROCEDURE — 1126F AMNT PAIN NOTED NONE PRSNT: CPT | Mod: CPTII,S$GLB,, | Performed by: FAMILY MEDICINE

## 2021-08-05 PROCEDURE — 1159F MED LIST DOCD IN RCRD: CPT | Mod: CPTII,S$GLB,, | Performed by: FAMILY MEDICINE

## 2021-08-05 PROCEDURE — 99396 PR PREVENTIVE VISIT,EST,40-64: ICD-10-PCS | Mod: 25,S$GLB,, | Performed by: FAMILY MEDICINE

## 2021-08-12 ENCOUNTER — PATIENT MESSAGE (OUTPATIENT)
Dept: SPEECH THERAPY | Facility: HOSPITAL | Age: 41
End: 2021-08-12

## 2021-08-24 ENCOUNTER — LAB VISIT (OUTPATIENT)
Dept: LAB | Facility: HOSPITAL | Age: 41
End: 2021-08-24
Attending: FAMILY MEDICINE
Payer: COMMERCIAL

## 2021-08-24 DIAGNOSIS — Z00.00 ROUTINE GENERAL MEDICAL EXAMINATION AT A HEALTH CARE FACILITY: ICD-10-CM

## 2021-08-24 LAB
ALBUMIN SERPL BCP-MCNC: 3.8 G/DL (ref 3.5–5.2)
ALP SERPL-CCNC: 79 U/L (ref 55–135)
ALT SERPL W/O P-5'-P-CCNC: 39 U/L (ref 10–44)
ANION GAP SERPL CALC-SCNC: 11 MMOL/L (ref 8–16)
AST SERPL-CCNC: 26 U/L (ref 10–40)
BASOPHILS # BLD AUTO: 0.06 K/UL (ref 0–0.2)
BASOPHILS NFR BLD: 0.7 % (ref 0–1.9)
BILIRUB SERPL-MCNC: 0.3 MG/DL (ref 0.1–1)
BUN SERPL-MCNC: 9 MG/DL (ref 6–20)
CALCIUM SERPL-MCNC: 9.3 MG/DL (ref 8.7–10.5)
CHLORIDE SERPL-SCNC: 103 MMOL/L (ref 95–110)
CHOLEST SERPL-MCNC: 150 MG/DL (ref 120–199)
CHOLEST/HDLC SERPL: 3 {RATIO} (ref 2–5)
CO2 SERPL-SCNC: 22 MMOL/L (ref 23–29)
CREAT SERPL-MCNC: 0.7 MG/DL (ref 0.5–1.4)
DIFFERENTIAL METHOD: ABNORMAL
EOSINOPHIL # BLD AUTO: 0.2 K/UL (ref 0–0.5)
EOSINOPHIL NFR BLD: 1.7 % (ref 0–8)
ERYTHROCYTE [DISTWIDTH] IN BLOOD BY AUTOMATED COUNT: 12.9 % (ref 11.5–14.5)
EST. GFR  (AFRICAN AMERICAN): >60 ML/MIN/1.73 M^2
EST. GFR  (NON AFRICAN AMERICAN): >60 ML/MIN/1.73 M^2
ESTIMATED AVG GLUCOSE: 117 MG/DL (ref 68–131)
GLUCOSE SERPL-MCNC: 96 MG/DL (ref 70–110)
HBA1C MFR BLD: 5.7 % (ref 4–5.6)
HCT VFR BLD AUTO: 39.4 % (ref 37–48.5)
HDLC SERPL-MCNC: 50 MG/DL (ref 40–75)
HDLC SERPL: 33.3 % (ref 20–50)
HGB BLD-MCNC: 12.3 G/DL (ref 12–16)
IMM GRANULOCYTES # BLD AUTO: 0.04 K/UL (ref 0–0.04)
IMM GRANULOCYTES NFR BLD AUTO: 0.5 % (ref 0–0.5)
LDLC SERPL CALC-MCNC: 83.8 MG/DL (ref 63–159)
LYMPHOCYTES # BLD AUTO: 2.2 K/UL (ref 1–4.8)
LYMPHOCYTES NFR BLD: 25.1 % (ref 18–48)
MCH RBC QN AUTO: 29.4 PG (ref 27–31)
MCHC RBC AUTO-ENTMCNC: 31.2 G/DL (ref 32–36)
MCV RBC AUTO: 94 FL (ref 82–98)
MONOCYTES # BLD AUTO: 0.6 K/UL (ref 0.3–1)
MONOCYTES NFR BLD: 7.4 % (ref 4–15)
NEUTROPHILS # BLD AUTO: 5.6 K/UL (ref 1.8–7.7)
NEUTROPHILS NFR BLD: 64.6 % (ref 38–73)
NONHDLC SERPL-MCNC: 100 MG/DL
NRBC BLD-RTO: 0 /100 WBC
PLATELET # BLD AUTO: 393 K/UL (ref 150–450)
PMV BLD AUTO: 10.6 FL (ref 9.2–12.9)
POTASSIUM SERPL-SCNC: 4 MMOL/L (ref 3.5–5.1)
PROT SERPL-MCNC: 7.6 G/DL (ref 6–8.4)
RBC # BLD AUTO: 4.19 M/UL (ref 4–5.4)
SODIUM SERPL-SCNC: 136 MMOL/L (ref 136–145)
TRIGL SERPL-MCNC: 81 MG/DL (ref 30–150)
TSH SERPL DL<=0.005 MIU/L-ACNC: 0.66 UIU/ML (ref 0.4–4)
WBC # BLD AUTO: 8.7 K/UL (ref 3.9–12.7)

## 2021-08-24 PROCEDURE — 83036 HEMOGLOBIN GLYCOSYLATED A1C: CPT | Performed by: FAMILY MEDICINE

## 2021-08-24 PROCEDURE — 85025 COMPLETE CBC W/AUTO DIFF WBC: CPT | Performed by: FAMILY MEDICINE

## 2021-08-24 PROCEDURE — 80053 COMPREHEN METABOLIC PANEL: CPT | Performed by: FAMILY MEDICINE

## 2021-08-24 PROCEDURE — 36415 COLL VENOUS BLD VENIPUNCTURE: CPT | Mod: PO | Performed by: FAMILY MEDICINE

## 2021-08-24 PROCEDURE — 80061 LIPID PANEL: CPT | Performed by: FAMILY MEDICINE

## 2021-08-24 PROCEDURE — 86803 HEPATITIS C AB TEST: CPT | Performed by: FAMILY MEDICINE

## 2021-08-24 PROCEDURE — 87389 HIV-1 AG W/HIV-1&-2 AB AG IA: CPT | Performed by: FAMILY MEDICINE

## 2021-08-24 PROCEDURE — 84443 ASSAY THYROID STIM HORMONE: CPT | Performed by: FAMILY MEDICINE

## 2021-08-25 LAB
HCV AB SERPL QL IA: NEGATIVE
HIV 1+2 AB+HIV1 P24 AG SERPL QL IA: NEGATIVE

## 2021-09-20 ENCOUNTER — OCCUPATIONAL HEALTH (OUTPATIENT)
Dept: URGENT CARE | Facility: CLINIC | Age: 41
End: 2021-09-20

## 2021-09-20 DIAGNOSIS — Z11.9 ENCOUNTER FOR SCREENING EXAMINATION FOR INFECTIOUS DISEASE: Primary | ICD-10-CM

## 2021-09-20 LAB
CTP QC/QA: YES
SARS-COV-2 RDRP RESP QL NAA+PROBE: NEGATIVE

## 2021-09-20 PROCEDURE — U0002 COVID-19 LAB TEST NON-CDC: HCPCS | Mod: QW,S$GLB,, | Performed by: NURSE PRACTITIONER

## 2021-09-20 PROCEDURE — 99199 UNLISTED SPECIAL SVC PX/RPRT: CPT | Mod: S$GLB,,, | Performed by: NURSE PRACTITIONER

## 2021-09-20 PROCEDURE — 99199 CV19 SPECIMEN HANDLING FEE / SWAB: ICD-10-PCS | Mod: S$GLB,,, | Performed by: NURSE PRACTITIONER

## 2021-09-20 PROCEDURE — U0002: ICD-10-PCS | Mod: QW,S$GLB,, | Performed by: NURSE PRACTITIONER

## 2021-10-05 ENCOUNTER — OFFICE VISIT (OUTPATIENT)
Dept: URGENT CARE | Facility: CLINIC | Age: 41
End: 2021-10-05
Payer: COMMERCIAL

## 2021-10-05 VITALS
HEIGHT: 59 IN | WEIGHT: 261 LBS | DIASTOLIC BLOOD PRESSURE: 96 MMHG | SYSTOLIC BLOOD PRESSURE: 167 MMHG | TEMPERATURE: 98 F | RESPIRATION RATE: 18 BRPM | OXYGEN SATURATION: 96 % | HEART RATE: 74 BPM | BODY MASS INDEX: 52.62 KG/M2

## 2021-10-05 DIAGNOSIS — H92.03 OTALGIA OF BOTH EARS: ICD-10-CM

## 2021-10-05 DIAGNOSIS — H66.91 RIGHT OTITIS MEDIA, UNSPECIFIED OTITIS MEDIA TYPE: Primary | ICD-10-CM

## 2021-10-05 LAB
CTP QC/QA: YES
SARS-COV-2 RDRP RESP QL NAA+PROBE: NEGATIVE

## 2021-10-05 PROCEDURE — 3044F HG A1C LEVEL LT 7.0%: CPT | Mod: CPTII,S$GLB,, | Performed by: NURSE PRACTITIONER

## 2021-10-05 PROCEDURE — 1159F MED LIST DOCD IN RCRD: CPT | Mod: CPTII,S$GLB,, | Performed by: NURSE PRACTITIONER

## 2021-10-05 PROCEDURE — 3008F BODY MASS INDEX DOCD: CPT | Mod: CPTII,S$GLB,, | Performed by: NURSE PRACTITIONER

## 2021-10-05 PROCEDURE — U0002 COVID-19 LAB TEST NON-CDC: HCPCS | Mod: QW,S$GLB,, | Performed by: NURSE PRACTITIONER

## 2021-10-05 PROCEDURE — 99204 OFFICE O/P NEW MOD 45 MIN: CPT | Mod: S$GLB,CS,, | Performed by: NURSE PRACTITIONER

## 2021-10-05 PROCEDURE — 1160F PR REVIEW ALL MEDS BY PRESCRIBER/CLIN PHARMACIST DOCUMENTED: ICD-10-PCS | Mod: CPTII,S$GLB,, | Performed by: NURSE PRACTITIONER

## 2021-10-05 PROCEDURE — 3077F PR MOST RECENT SYSTOLIC BLOOD PRESSURE >= 140 MM HG: ICD-10-PCS | Mod: CPTII,S$GLB,, | Performed by: NURSE PRACTITIONER

## 2021-10-05 PROCEDURE — U0002: ICD-10-PCS | Mod: QW,S$GLB,, | Performed by: NURSE PRACTITIONER

## 2021-10-05 PROCEDURE — 1159F PR MEDICATION LIST DOCUMENTED IN MEDICAL RECORD: ICD-10-PCS | Mod: CPTII,S$GLB,, | Performed by: NURSE PRACTITIONER

## 2021-10-05 PROCEDURE — 3008F PR BODY MASS INDEX (BMI) DOCUMENTED: ICD-10-PCS | Mod: CPTII,S$GLB,, | Performed by: NURSE PRACTITIONER

## 2021-10-05 PROCEDURE — 3044F PR MOST RECENT HEMOGLOBIN A1C LEVEL <7.0%: ICD-10-PCS | Mod: CPTII,S$GLB,, | Performed by: NURSE PRACTITIONER

## 2021-10-05 PROCEDURE — 3080F DIAST BP >= 90 MM HG: CPT | Mod: CPTII,S$GLB,, | Performed by: NURSE PRACTITIONER

## 2021-10-05 PROCEDURE — 1160F RVW MEDS BY RX/DR IN RCRD: CPT | Mod: CPTII,S$GLB,, | Performed by: NURSE PRACTITIONER

## 2021-10-05 PROCEDURE — 99204 PR OFFICE/OUTPT VISIT, NEW, LEVL IV, 45-59 MIN: ICD-10-PCS | Mod: S$GLB,CS,, | Performed by: NURSE PRACTITIONER

## 2021-10-05 PROCEDURE — 3080F PR MOST RECENT DIASTOLIC BLOOD PRESSURE >= 90 MM HG: ICD-10-PCS | Mod: CPTII,S$GLB,, | Performed by: NURSE PRACTITIONER

## 2021-10-05 PROCEDURE — 3077F SYST BP >= 140 MM HG: CPT | Mod: CPTII,S$GLB,, | Performed by: NURSE PRACTITIONER

## 2021-10-05 RX ORDER — DOXYCYCLINE 100 MG/1
100 CAPSULE ORAL 2 TIMES DAILY
Qty: 10 CAPSULE | Refills: 0 | OUTPATIENT
Start: 2021-10-05 | End: 2022-05-07

## 2021-11-02 ENCOUNTER — OFFICE VISIT (OUTPATIENT)
Dept: OTOLARYNGOLOGY | Facility: CLINIC | Age: 41
End: 2021-11-02
Payer: COMMERCIAL

## 2021-11-02 VITALS — WEIGHT: 261 LBS | BODY MASS INDEX: 52.72 KG/M2 | SYSTOLIC BLOOD PRESSURE: 119 MMHG | DIASTOLIC BLOOD PRESSURE: 81 MMHG

## 2021-11-02 DIAGNOSIS — H60.501 ACUTE OTITIS EXTERNA OF RIGHT EAR, UNSPECIFIED TYPE: Primary | ICD-10-CM

## 2021-11-02 DIAGNOSIS — H92.01 RIGHT EAR PAIN: ICD-10-CM

## 2021-11-02 DIAGNOSIS — H92.11 OTORRHEA OF RIGHT EAR: ICD-10-CM

## 2021-11-02 PROCEDURE — 99213 PR OFFICE/OUTPT VISIT, EST, LEVL III, 20-29 MIN: ICD-10-PCS | Mod: S$GLB,,, | Performed by: NURSE PRACTITIONER

## 2021-11-02 PROCEDURE — 3044F PR MOST RECENT HEMOGLOBIN A1C LEVEL <7.0%: ICD-10-PCS | Mod: CPTII,S$GLB,, | Performed by: NURSE PRACTITIONER

## 2021-11-02 PROCEDURE — 3074F PR MOST RECENT SYSTOLIC BLOOD PRESSURE < 130 MM HG: ICD-10-PCS | Mod: CPTII,S$GLB,, | Performed by: NURSE PRACTITIONER

## 2021-11-02 PROCEDURE — 1159F PR MEDICATION LIST DOCUMENTED IN MEDICAL RECORD: ICD-10-PCS | Mod: CPTII,S$GLB,, | Performed by: NURSE PRACTITIONER

## 2021-11-02 PROCEDURE — 99213 OFFICE O/P EST LOW 20 MIN: CPT | Mod: S$GLB,,, | Performed by: NURSE PRACTITIONER

## 2021-11-02 PROCEDURE — 3008F PR BODY MASS INDEX (BMI) DOCUMENTED: ICD-10-PCS | Mod: CPTII,S$GLB,, | Performed by: NURSE PRACTITIONER

## 2021-11-02 PROCEDURE — 3079F DIAST BP 80-89 MM HG: CPT | Mod: CPTII,S$GLB,, | Performed by: NURSE PRACTITIONER

## 2021-11-02 PROCEDURE — 3079F PR MOST RECENT DIASTOLIC BLOOD PRESSURE 80-89 MM HG: ICD-10-PCS | Mod: CPTII,S$GLB,, | Performed by: NURSE PRACTITIONER

## 2021-11-02 PROCEDURE — 1160F RVW MEDS BY RX/DR IN RCRD: CPT | Mod: CPTII,S$GLB,, | Performed by: NURSE PRACTITIONER

## 2021-11-02 PROCEDURE — 3074F SYST BP LT 130 MM HG: CPT | Mod: CPTII,S$GLB,, | Performed by: NURSE PRACTITIONER

## 2021-11-02 PROCEDURE — 1160F PR REVIEW ALL MEDS BY PRESCRIBER/CLIN PHARMACIST DOCUMENTED: ICD-10-PCS | Mod: CPTII,S$GLB,, | Performed by: NURSE PRACTITIONER

## 2021-11-02 PROCEDURE — 3044F HG A1C LEVEL LT 7.0%: CPT | Mod: CPTII,S$GLB,, | Performed by: NURSE PRACTITIONER

## 2021-11-02 PROCEDURE — 3008F BODY MASS INDEX DOCD: CPT | Mod: CPTII,S$GLB,, | Performed by: NURSE PRACTITIONER

## 2021-11-02 PROCEDURE — 1159F MED LIST DOCD IN RCRD: CPT | Mod: CPTII,S$GLB,, | Performed by: NURSE PRACTITIONER

## 2021-11-02 RX ORDER — OFLOXACIN 3 MG/ML
5 SOLUTION AURICULAR (OTIC) 2 TIMES DAILY
Qty: 10 ML | Refills: 0 | Status: SHIPPED | OUTPATIENT
Start: 2021-11-02 | End: 2022-05-09

## 2021-11-13 ENCOUNTER — PATIENT OUTREACH (OUTPATIENT)
Dept: ADMINISTRATIVE | Facility: OTHER | Age: 41
End: 2021-11-13
Payer: COMMERCIAL

## 2021-11-16 ENCOUNTER — OFFICE VISIT (OUTPATIENT)
Dept: OTOLARYNGOLOGY | Facility: CLINIC | Age: 41
End: 2021-11-16
Payer: COMMERCIAL

## 2021-11-16 DIAGNOSIS — H92.11 OTORRHEA OF RIGHT EAR: ICD-10-CM

## 2021-11-16 DIAGNOSIS — H60.501 ACUTE OTITIS EXTERNA OF RIGHT EAR, UNSPECIFIED TYPE: Primary | ICD-10-CM

## 2021-11-16 PROCEDURE — 99212 OFFICE O/P EST SF 10 MIN: CPT | Mod: S$GLB,,, | Performed by: NURSE PRACTITIONER

## 2021-11-16 PROCEDURE — 99212 PR OFFICE/OUTPT VISIT, EST, LEVL II, 10-19 MIN: ICD-10-PCS | Mod: S$GLB,,, | Performed by: NURSE PRACTITIONER

## 2021-11-16 PROCEDURE — 1159F PR MEDICATION LIST DOCUMENTED IN MEDICAL RECORD: ICD-10-PCS | Mod: CPTII,S$GLB,, | Performed by: NURSE PRACTITIONER

## 2021-11-16 PROCEDURE — 1160F RVW MEDS BY RX/DR IN RCRD: CPT | Mod: CPTII,S$GLB,, | Performed by: NURSE PRACTITIONER

## 2021-11-16 PROCEDURE — 1160F PR REVIEW ALL MEDS BY PRESCRIBER/CLIN PHARMACIST DOCUMENTED: ICD-10-PCS | Mod: CPTII,S$GLB,, | Performed by: NURSE PRACTITIONER

## 2021-11-16 PROCEDURE — 3044F HG A1C LEVEL LT 7.0%: CPT | Mod: CPTII,S$GLB,, | Performed by: NURSE PRACTITIONER

## 2021-11-16 PROCEDURE — 1159F MED LIST DOCD IN RCRD: CPT | Mod: CPTII,S$GLB,, | Performed by: NURSE PRACTITIONER

## 2021-11-16 PROCEDURE — 3044F PR MOST RECENT HEMOGLOBIN A1C LEVEL <7.0%: ICD-10-PCS | Mod: CPTII,S$GLB,, | Performed by: NURSE PRACTITIONER

## 2022-02-23 DIAGNOSIS — D84.9 IMMUNOSUPPRESSED STATUS: ICD-10-CM

## 2022-05-07 ENCOUNTER — HOSPITAL ENCOUNTER (EMERGENCY)
Facility: HOSPITAL | Age: 42
Discharge: HOME | End: 2022-05-07
Attending: EMERGENCY MEDICINE
Payer: COMMERCIAL

## 2022-05-07 VITALS
OXYGEN SATURATION: 99 % | SYSTOLIC BLOOD PRESSURE: 172 MMHG | BODY MASS INDEX: 52.41 KG/M2 | RESPIRATION RATE: 18 BRPM | WEIGHT: 260 LBS | DIASTOLIC BLOOD PRESSURE: 106 MMHG | HEART RATE: 81 BPM | TEMPERATURE: 98 F | HEIGHT: 59 IN

## 2022-05-07 DIAGNOSIS — H92.02 OTALGIA OF LEFT EAR: Primary | ICD-10-CM

## 2022-05-07 DIAGNOSIS — H66.002 ACUTE SUPPURATIVE OTITIS MEDIA OF LEFT EAR WITHOUT SPONTANEOUS RUPTURE OF TYMPANIC MEMBRANE, RECURRENCE NOT SPECIFIED: ICD-10-CM

## 2022-05-07 LAB
B-HCG UR QL: NEGATIVE
CTP QC/QA: YES

## 2022-05-07 PROCEDURE — 81025 URINE PREGNANCY TEST: CPT | Mod: ER | Performed by: EMERGENCY MEDICINE

## 2022-05-07 PROCEDURE — 96372 THER/PROPH/DIAG INJ SC/IM: CPT | Performed by: EMERGENCY MEDICINE

## 2022-05-07 PROCEDURE — 63600175 PHARM REV CODE 636 W HCPCS: Mod: ER | Performed by: EMERGENCY MEDICINE

## 2022-05-07 PROCEDURE — 99284 EMERGENCY DEPT VISIT MOD MDM: CPT | Mod: ER

## 2022-05-07 RX ORDER — MONTELUKAST SODIUM 10 MG/1
10 TABLET ORAL NIGHTLY
Qty: 30 TABLET | Refills: 0 | Status: SHIPPED | OUTPATIENT
Start: 2022-05-07 | End: 2022-06-06

## 2022-05-07 RX ORDER — KETOROLAC TROMETHAMINE 10 MG/1
10 TABLET, FILM COATED ORAL EVERY 6 HOURS PRN
Qty: 12 TABLET | Refills: 0 | Status: SHIPPED | OUTPATIENT
Start: 2022-05-07 | End: 2022-05-10

## 2022-05-07 RX ORDER — FLUTICASONE PROPIONATE 50 MCG
2 SPRAY, SUSPENSION (ML) NASAL DAILY
Qty: 16 G | Refills: 0 | Status: SHIPPED | OUTPATIENT
Start: 2022-05-07

## 2022-05-07 RX ORDER — DEXAMETHASONE SODIUM PHOSPHATE 4 MG/ML
12 INJECTION, SOLUTION INTRA-ARTICULAR; INTRALESIONAL; INTRAMUSCULAR; INTRAVENOUS; SOFT TISSUE
Status: COMPLETED | OUTPATIENT
Start: 2022-05-07 | End: 2022-05-07

## 2022-05-07 RX ORDER — LORATADINE 10 MG/1
10 TABLET ORAL EVERY MORNING
Qty: 60 TABLET | Refills: 0 | Status: SHIPPED | OUTPATIENT
Start: 2022-05-07 | End: 2023-05-07

## 2022-05-07 RX ORDER — KETOROLAC TROMETHAMINE 30 MG/ML
30 INJECTION, SOLUTION INTRAMUSCULAR; INTRAVENOUS
Status: COMPLETED | OUTPATIENT
Start: 2022-05-07 | End: 2022-05-07

## 2022-05-07 RX ORDER — PREDNISONE 20 MG/1
40 TABLET ORAL DAILY
Qty: 10 TABLET | Refills: 0 | Status: SHIPPED | OUTPATIENT
Start: 2022-05-07 | End: 2022-05-12

## 2022-05-07 RX ORDER — AZITHROMYCIN 250 MG/1
250 TABLET, FILM COATED ORAL DAILY
Qty: 6 TABLET | Refills: 0 | Status: SHIPPED | OUTPATIENT
Start: 2022-05-07

## 2022-05-07 RX ADMIN — KETOROLAC TROMETHAMINE 30 MG: 30 INJECTION, SOLUTION INTRAMUSCULAR at 04:05

## 2022-05-07 RX ADMIN — DEXAMETHASONE SODIUM PHOSPHATE 12 MG: 4 INJECTION INTRA-ARTICULAR; INTRALESIONAL; INTRAMUSCULAR; INTRAVENOUS; SOFT TISSUE at 04:05

## 2022-05-07 NOTE — ED PROVIDER NOTES
"Encounter Date: 2022       History     Chief Complaint   Patient presents with    Otalgia     Left ear pain x 1 week      41 y.o. female with elevated BMI presents to emergency department complaining of acute left-sided ear pain and "under water" feeling in left ear that began one week ago.  She endorses acute postnasal drip that began yesterday but denies fever drainage from the ear, tinnitus or hearing loss.  Has Claritin seasonal allergies recent.        Review of patient's allergies indicates:   Allergen Reactions    Amoxicillin Hives     Past Medical History:   Diagnosis Date    Graves disease     Mastitis     left breast     Past Surgical History:   Procedure Laterality Date    APPENDECTOMY      BREAST SURGERY  2003    left breast     SECTION      x1    THYROIDECTOMY       Family History   Problem Relation Age of Onset    Liver cancer Paternal Grandmother     Lung cancer Paternal Uncle         smoked    Cancer Paternal Aunt         bone marrow     Social History     Tobacco Use    Smoking status: Never Smoker    Smokeless tobacco: Never Used   Substance Use Topics    Alcohol use: Yes    Drug use: No     Review of Systems   Constitutional: Negative for fever.   HENT: Positive for ear pain. Negative for congestion, ear discharge, hearing loss and postnasal drip.    Respiratory: Negative for cough.    All other systems reviewed and are negative.      Physical Exam     Initial Vitals [22 0416]   BP Pulse Resp Temp SpO2   (!) 172/106 81 18 98.2 °F (36.8 °C) 99 %      MAP       --         Physical Exam    Nursing note and vitals reviewed.  Constitutional: She appears well-developed and well-nourished. She is not diaphoretic. She is Obese . No distress.   HENT:   Head: Normocephalic and atraumatic.   Right Ear: No drainage, swelling or tenderness. No mastoid tenderness. Tympanic membrane is bulging. Tympanic membrane is not injected and not erythematous. A middle ear effusion (Serous) " is present. No decreased hearing is noted.   Left Ear: No drainage, swelling or tenderness. No mastoid tenderness. Tympanic membrane is injected, erythematous and bulging. Tympanic membrane is not perforated. A middle ear effusion (serous/cloudy) is present. No decreased hearing is noted.   Eyes: Conjunctivae are normal.   Neck: Neck supple.   Normal range of motion.  Cardiovascular: Normal rate and intact distal pulses.   Pulmonary/Chest: No accessory muscle usage or stridor. No tachypnea. No respiratory distress.   Abdominal: She exhibits no distension. There is no abdominal tenderness.   Musculoskeletal:         General: No tenderness. Normal range of motion.      Cervical back: Normal range of motion and neck supple.     Neurological: She is alert and oriented to person, place, and time. She has normal strength. Gait normal. GCS eye subscore is 4. GCS verbal subscore is 5. GCS motor subscore is 6.   Skin: Skin is warm. Capillary refill takes less than 2 seconds.   Psychiatric: She has a normal mood and affect.         ED Course   Procedures  Labs Reviewed   POCT URINE PREGNANCY          Imaging Results    None          Medications   ketorolac injection 30 mg (30 mg Intramuscular Given 5/7/22 0438)   dexamethasone injection 12 mg (12 mg Intramuscular Given 5/7/22 0438)                        Labs Reviewed  Admission on 05/07/2022   Component Date Value Ref Range Status    POC Preg Test, Ur 05/07/2022 Negative  Negative Final     Acceptable 05/07/2022 Yes   Final        Imaging Reviewed    Imaging Results    None         Medications given in ED    Medications   ketorolac injection 30 mg (30 mg Intramuscular Given 5/7/22 0438)   dexamethasone injection 12 mg (12 mg Intramuscular Given 5/7/22 0438)       Note was created using voice recognition software. Note may have occasional typographical errors that may not have been identified and edited despite good edi initial review prior to  signing.    Clinical Impression:   Final diagnoses:  [H92.02] Otalgia of left ear (Primary)  [H66.002] Acute suppurative otitis media of left ear without spontaneous rupture of tympanic membrane, recurrence not specified          ED Disposition Condition    Discharge Stable        ED Prescriptions     Medication Sig Dispense Start Date End Date Auth. Provider    fluticasone propionate (FLONASE) 50 mcg/actuation nasal spray 2 sprays (100 mcg total) by Each Nostril route once daily. 16 g 5/7/2022  Lukasz Jay MD    loratadine (CLARITIN) 10 mg tablet Take 1 tablet (10 mg total) by mouth every morning. 60 tablet 5/7/2022 5/7/2023 Lukasz Jay MD    montelukast (SINGULAIR) 10 mg tablet Take 1 tablet (10 mg total) by mouth every evening. 30 tablet 5/7/2022 6/6/2022 Lukasz Jay MD    predniSONE (DELTASONE) 20 MG tablet Take 2 tablets (40 mg total) by mouth once daily. for 5 days 10 tablet 5/7/2022 5/12/2022 Lukasz Jay MD    azithromycin (Z-JUAN) 250 MG tablet Take 1 tablet (250 mg total) by mouth once daily. Take first 2 tablets together, then 1 every day until finished. 6 tablet 5/7/2022  Lukasz Jay MD    ketorolac (TORADOL) 10 mg tablet Take 1 tablet (10 mg total) by mouth every 6 (six) hours as needed for Pain (take with food). 12 tablet 5/7/2022 5/10/2022 Lukasz Jay MD        Follow-up Information     Follow up With Specialties Details Why Contact Info Additional Information    Mildred Gonzalez MD Family Medicine Call  As needed, for ongoing care [address] [facility] [address] [phone]       [facility] Otolaryngology Call  Monday morning, to schedule an appointment, for re-evaluation of today's complaint, and ongoing care [address]  [facility] [address] [phone] Please park in garage or surface lot and use [address] entrance. Check in at [address].     The nearest emergency department.  Go to  As needed, If symptoms worsen             Lukasz  MD Pierre  05/07/22 045

## 2022-05-11 ENCOUNTER — TELEPHONE (OUTPATIENT)
Dept: OTOLARYNGOLOGY | Facility: CLINIC | Age: 42
End: 2022-05-11
Payer: COMMERCIAL

## 2022-05-11 NOTE — TELEPHONE ENCOUNTER
LVM calling in regards to scheduling patient an appointment for after she completes her drops order by Dr Wilkinson.

## 2022-07-12 ENCOUNTER — HOSPITAL ENCOUNTER (EMERGENCY)
Facility: HOSPITAL | Age: 42
Discharge: HOME | End: 2022-07-12
Attending: EMERGENCY MEDICINE
Payer: COMMERCIAL

## 2022-07-12 VITALS
OXYGEN SATURATION: 96 % | TEMPERATURE: 98 F | SYSTOLIC BLOOD PRESSURE: 162 MMHG | HEART RATE: 55 BPM | BODY MASS INDEX: 50.49 KG/M2 | WEIGHT: 250 LBS | RESPIRATION RATE: 20 BRPM | DIASTOLIC BLOOD PRESSURE: 95 MMHG

## 2022-07-12 DIAGNOSIS — K59.00 CONSTIPATION, UNSPECIFIED CONSTIPATION TYPE: ICD-10-CM

## 2022-07-12 DIAGNOSIS — K29.00 ACUTE SUPERFICIAL GASTRITIS WITHOUT HEMORRHAGE: ICD-10-CM

## 2022-07-12 DIAGNOSIS — S39.012A STRAIN OF LUMBAR REGION, INITIAL ENCOUNTER: Primary | ICD-10-CM

## 2022-07-12 DIAGNOSIS — R07.89 CHEST TIGHTNESS: ICD-10-CM

## 2022-07-12 DIAGNOSIS — R07.89 CHEST PRESSURE: ICD-10-CM

## 2022-07-12 LAB
ALBUMIN SERPL-MCNC: 3.7 G/DL (ref 3.3–5.5)
ALBUMIN SERPL-MCNC: 3.9 G/DL (ref 3.3–5.5)
ALP SERPL-CCNC: 81 U/L (ref 42–141)
ALP SERPL-CCNC: 85 U/L (ref 42–141)
B-HCG UR QL: NEGATIVE
BILIRUB SERPL-MCNC: 0.6 MG/DL (ref 0.2–1.6)
BILIRUB SERPL-MCNC: 0.6 MG/DL (ref 0.2–1.6)
BILIRUBIN, POC UA: NEGATIVE
BLOOD, POC UA: NEGATIVE
BUN SERPL-MCNC: 10 MG/DL (ref 7–22)
CALCIUM SERPL-MCNC: 9.7 MG/DL (ref 8–10.3)
CHLORIDE SERPL-SCNC: 97 MMOL/L (ref 98–108)
CLARITY, POC UA: CLEAR
COLOR, POC UA: YELLOW
CREAT SERPL-MCNC: 0.7 MG/DL (ref 0.6–1.2)
CTP QC/QA: YES
GLUCOSE SERPL-MCNC: 96 MG/DL (ref 73–118)
GLUCOSE, POC UA: NEGATIVE
KETONES, POC UA: NEGATIVE
LEUKOCYTE EST, POC UA: NEGATIVE
NITRITE, POC UA: NEGATIVE
PH UR STRIP: 5.5 [PH]
POC ALT (SGPT): 25 U/L (ref 10–47)
POC ALT (SGPT): 28 U/L (ref 10–47)
POC AMYLASE: 54 U/L (ref 14–97)
POC AST (SGOT): 22 U/L (ref 11–38)
POC AST (SGOT): 23 U/L (ref 11–38)
POC B-TYPE NATRIURETIC PEPTIDE: 84.2 PG/ML (ref 0–100)
POC CARDIAC TROPONIN I: 0 NG/ML
POC GGT: 25 U/L (ref 5–65)
POC TCO2: 25 MMOL/L (ref 18–33)
POTASSIUM BLD-SCNC: 4.2 MMOL/L (ref 3.6–5.1)
PROTEIN, POC UA: NEGATIVE
PROTEIN, POC: 7.6 G/DL (ref 6.4–8.1)
PROTEIN, POC: 7.6 G/DL (ref 6.4–8.1)
SAMPLE: NORMAL
SODIUM BLD-SCNC: 137 MMOL/L (ref 128–145)
SPECIFIC GRAVITY, POC UA: 1.02
UROBILINOGEN, POC UA: 0.2 E.U./DL

## 2022-07-12 PROCEDURE — 93010 ELECTROCARDIOGRAM REPORT: CPT | Mod: ,,, | Performed by: INTERNAL MEDICINE

## 2022-07-12 PROCEDURE — 85025 COMPLETE CBC W/AUTO DIFF WBC: CPT | Mod: ER

## 2022-07-12 PROCEDURE — 93010 EKG 12-LEAD: ICD-10-PCS | Mod: ,,, | Performed by: INTERNAL MEDICINE

## 2022-07-12 PROCEDURE — 99285 EMERGENCY DEPT VISIT HI MDM: CPT | Mod: 25,ER

## 2022-07-12 PROCEDURE — 96361 HYDRATE IV INFUSION ADD-ON: CPT | Mod: ER

## 2022-07-12 PROCEDURE — 81003 URINALYSIS AUTO W/O SCOPE: CPT | Mod: ER

## 2022-07-12 PROCEDURE — 82150 ASSAY OF AMYLASE: CPT | Mod: ER

## 2022-07-12 PROCEDURE — 81025 URINE PREGNANCY TEST: CPT | Mod: ER | Performed by: EMERGENCY MEDICINE

## 2022-07-12 PROCEDURE — 93005 ELECTROCARDIOGRAM TRACING: CPT | Mod: ER

## 2022-07-12 PROCEDURE — 63600175 PHARM REV CODE 636 W HCPCS: Mod: ER | Performed by: EMERGENCY MEDICINE

## 2022-07-12 PROCEDURE — 84484 ASSAY OF TROPONIN QUANT: CPT | Mod: ER

## 2022-07-12 PROCEDURE — 80053 COMPREHEN METABOLIC PANEL: CPT | Mod: ER

## 2022-07-12 PROCEDURE — 25000003 PHARM REV CODE 250: Mod: ER | Performed by: EMERGENCY MEDICINE

## 2022-07-12 PROCEDURE — 96374 THER/PROPH/DIAG INJ IV PUSH: CPT | Mod: ER

## 2022-07-12 PROCEDURE — 83880 ASSAY OF NATRIURETIC PEPTIDE: CPT | Mod: ER

## 2022-07-12 PROCEDURE — 25500020 PHARM REV CODE 255: Mod: ER | Performed by: EMERGENCY MEDICINE

## 2022-07-12 RX ORDER — ACETAMINOPHEN 500 MG
1000 TABLET ORAL EVERY 8 HOURS PRN
Qty: 30 TABLET | Refills: 0 | Status: SHIPPED | OUTPATIENT
Start: 2022-07-12 | End: 2022-07-17

## 2022-07-12 RX ORDER — POLYETHYLENE GLYCOL 3350 17 G/17G
17 POWDER, FOR SOLUTION ORAL DAILY
Qty: 3 PACKET | Refills: 0 | Status: SHIPPED | OUTPATIENT
Start: 2022-07-12 | End: 2022-07-15

## 2022-07-12 RX ORDER — DOCUSATE SODIUM 100 MG/1
100 CAPSULE, LIQUID FILLED ORAL 2 TIMES DAILY
Qty: 20 CAPSULE | Refills: 0 | Status: SHIPPED | OUTPATIENT
Start: 2022-07-12 | End: 2022-07-22

## 2022-07-12 RX ORDER — METHOCARBAMOL 500 MG/1
1000 TABLET, FILM COATED ORAL
Status: COMPLETED | OUTPATIENT
Start: 2022-07-12 | End: 2022-07-12

## 2022-07-12 RX ORDER — HYDROMORPHONE HYDROCHLORIDE 2 MG/ML
0.5 INJECTION, SOLUTION INTRAMUSCULAR; INTRAVENOUS; SUBCUTANEOUS
Status: COMPLETED | OUTPATIENT
Start: 2022-07-12 | End: 2022-07-12

## 2022-07-12 RX ORDER — OMEPRAZOLE 20 MG/1
20 CAPSULE, DELAYED RELEASE ORAL DAILY
Qty: 30 CAPSULE | Refills: 1 | Status: SHIPPED | OUTPATIENT
Start: 2022-07-12 | End: 2022-09-10

## 2022-07-12 RX ORDER — METHOCARBAMOL 750 MG/1
750 TABLET, FILM COATED ORAL 3 TIMES DAILY
Qty: 15 TABLET | Refills: 0 | Status: SHIPPED | OUTPATIENT
Start: 2022-07-12 | End: 2022-07-17

## 2022-07-12 RX ORDER — ACETAMINOPHEN 500 MG
1000 TABLET ORAL
Status: COMPLETED | OUTPATIENT
Start: 2022-07-12 | End: 2022-07-12

## 2022-07-12 RX ADMIN — METHOCARBAMOL 1000 MG: 500 TABLET ORAL at 07:07

## 2022-07-12 RX ADMIN — HYDROMORPHONE HYDROCHLORIDE 0.5 MG: 2 INJECTION INTRAMUSCULAR; INTRAVENOUS; SUBCUTANEOUS at 10:07

## 2022-07-12 RX ADMIN — IOHEXOL 100 ML: 350 INJECTION, SOLUTION INTRAVENOUS at 10:07

## 2022-07-12 RX ADMIN — SODIUM CHLORIDE 1000 ML: 0.9 INJECTION, SOLUTION INTRAVENOUS at 10:07

## 2022-07-12 RX ADMIN — ACETAMINOPHEN 1000 MG: 500 TABLET, FILM COATED ORAL at 07:07

## 2022-07-12 RX ADMIN — ALUMINUM HYDROXIDE, MAGNESIUM HYDROXIDE, AND SIMETHICONE: 200; 200; 20 SUSPENSION ORAL at 07:07

## 2022-07-12 NOTE — ED PROVIDER NOTES
"Encounter Date: 2022    SCRIBE #1 NOTE: I, Janell Terrazas, am scribing for, and in the presence of,  Lydia Drake MD. I have scribed the following portions of the note - Other sections scribed: HPI; ROS; PE.       History     Chief Complaint   Patient presents with    Back Pain    Abdominal Pain     Pt states," I had back pain that started yesterday. All last night my back was hurting and this morning I have pain in my back, stomach and I have pressure in my chest."     Malika Johnson is a 41 y.o. female with Hx of Graves disease who presents to the ED for chief complaint of back pain onset yesterday with associated upper abdominal pain and chest tightness that feels like anxiety. Patient reports sitting in uncomfortable chairs yesterday while on [employer]. She attempted treatment with Ibuprofen and using a heating pad with temporary relief. Patient states that she has  had low back back pain like this in the past but this feels a little worse. It is not increased by anything specifically. She is currently having chest tightness consistent with feeling anxious. Patient also reports having abdominal pain, nausea, constipation, subjective fever, and chills. LBM was today and a small amount. There is no change in the abdominal pain with eating. Patient denies any urinary symptoms, shortness of breath, weakness, numbness, or tingling. Denies any PE/DVT risk factors.     The history is provided by the patient. No  was used.     Review of patient's allergies indicates:   Allergen Reactions    Amoxicillin Hives     Past Medical History:   Diagnosis Date    Graves disease     Mastitis     left breast     Past Surgical History:   Procedure Laterality Date    APPENDECTOMY      BREAST SURGERY  2003    left breast     SECTION      x1    THYROIDECTOMY       Family History   Problem Relation Age of Onset    Liver cancer Paternal Grandmother     Lung cancer Paternal Uncle        "  smoked    Cancer Paternal Aunt         bone marrow     Social History     Tobacco Use    Smoking status: Never Smoker    Smokeless tobacco: Never Used   Substance Use Topics    Alcohol use: Yes    Drug use: No     Review of Systems   Constitutional: Positive for chills and fever.   HENT: Negative for congestion and ear pain.    Eyes: Negative for pain and visual disturbance.   Respiratory: Positive for chest tightness (consistent with feeling anxious) and shortness of breath. Negative for cough.    Cardiovascular: Negative for chest pain, palpitations and leg swelling.   Gastrointestinal: Positive for abdominal pain and nausea. Negative for diarrhea and vomiting.   Genitourinary: Negative for dysuria, frequency, hematuria and urgency.   Musculoskeletal: Positive for back pain. Negative for arthralgias and myalgias.   Skin: Negative for color change, pallor, rash and wound.   Allergic/Immunologic: Negative for environmental allergies, food allergies and immunocompromised state.   Neurological: Negative for dizziness, weakness, light-headedness, numbness and headaches.   Hematological: Negative for adenopathy. Does not bruise/bleed easily.   Psychiatric/Behavioral: Negative for agitation, behavioral problems and confusion. The patient is nervous/anxious.    All other systems reviewed and are negative.      Physical Exam     Initial Vitals [07/12/22 0702]   BP Pulse Resp Temp SpO2   (!) 171/96 63 16 98.4 °F (36.9 °C) 99 %      MAP       --         Physical Exam    Nursing note and vitals reviewed.  Constitutional: She appears well-developed and well-nourished. She is not diaphoretic. No distress.   HENT:   Head: Normocephalic and atraumatic.   Eyes: Conjunctivae and EOM are normal. Pupils are equal, round, and reactive to light.   Neck: Neck supple.   Normal range of motion.  Cardiovascular: Normal rate, regular rhythm, normal heart sounds and intact distal pulses. Exam reveals no gallop and no friction rub.     No murmur heard.  Pulmonary/Chest: Breath sounds normal. No stridor. No respiratory distress. She has no wheezes. She has no rhonchi. She has no rales. She exhibits no tenderness.   Abdominal: Abdomen is soft. Bowel sounds are normal. She exhibits no distension and no mass. There is abdominal tenderness in the right upper quadrant and left upper quadrant. There is no rebound and no guarding.   Musculoskeletal:         General: No tenderness or edema. Normal range of motion.      Cervical back: Normal range of motion and neck supple.     Neurological: She is alert and oriented to person, place, and time.   No focal deficits on neuro exam   Skin: Skin is warm and dry.   Psychiatric: She has a normal mood and affect. Her behavior is normal. Thought content normal.         ED Course   Procedures  Labs Reviewed   POCT CMP - Abnormal; Notable for the following components:       Result Value    POC Chloride 97 (*)     All other components within normal limits   TROPONIN ISTAT   POCT URINE PREGNANCY   POCT CBC   POCT URINALYSIS W/O SCOPE   POCT URINALYSIS W/O SCOPE   POCT CMP   POCT LIVER PANEL   POCT TROPONIN   POCT B-TYPE NATRIURETIC PEPTIDE (BNP)   POCT LIVER PANEL   POCT B-TYPE NATRIURETIC PEPTIDE (BNP)     EKG Readings: (Independently Interpreted)   Initial Reading: No STEMI. Rhythm: Sinus Bradycardia. Heart Rate: 57. ST Segments: Normal ST Segments. T Waves: Normal.   +motion artifact       Imaging Results          CT Abdomen Pelvis With Contrast (Final result)  Result time 07/12/22 11:22:47    Final result by Rk Avina MD (07/12/22 11:22:47)                 Impression:      1. No definite acute findings identified in the abdomen or pelvis.  2. Suggested cholelithiasis.  3. Additional details, as provided in the body of report.      Electronically signed by: Rk Avina  Date:    07/12/2022  Time:    11:22             Narrative:    EXAMINATION:  CT ABDOMEN PELVIS WITH CONTRAST    CLINICAL  HISTORY:  abdominal pain;    TECHNIQUE:  Low dose axial images, sagittal and coronal reformations were obtained from the lung bases to the pubic symphysis following the IV administration of 100 mL of Omnipaque 350    COMPARISON:  None.    FINDINGS:  Lower chest: Dependent atelectasis is suggested.    Liver: Unremarkable.    Gallbladder and bile ducts: Cholelithiasis suggested.  No definite pericholecystic inflammatory change or biliary ductal dilatation.    Pancreas: Unremarkable.    Spleen: Unremarkable.    Adrenals: Unremarkable.    Kidneys: Unremarkable.    Lymph nodes: No abdominal or pelvic lymphadenopathy.    Bowel and mesentery: No definite evidence of acute bowel obstruction.  Colonic diverticulosis without definite evidence of acute diverticulitis.  Appendix not clearly seen.    Abdominal aorta: Nonaneurysmal    Inferior vena cava: Unremarkable.    Free fluid or free air: None.    Pelvis: Left adnexal cystic lesion measures approximately 34 x 37 x 33 mm and could represent appearing cyst or dominant follicle.    Body wall: Unremarkable.    Bones: No acute finding.                               X-Ray Abdomen Flat And Erect (Final result)  Result time 07/12/22 08:52:27    Final result by Rk Avina MD (07/12/22 08:52:27)                 Impression:      No definite acute abnormality appreciated.      Electronically signed by: Rk Avina  Date:    07/12/2022  Time:    08:52             Narrative:    EXAMINATION:  XR ABDOMEN FLAT AND ERECT    CLINICAL HISTORY:  Abdominal Pain;    TECHNIQUE:  Flat and erect AP views of the abdomen were performed.    COMPARISON:  None.    FINDINGS:  No definite dilated loops of small or large bowel appreciated. No definite fluid levels or free air appreciated on the upright view.    Moderate volume colonic stool.  No definite acute abnormality of the visualized lower chest.  Phleboliths appear to be present.  No definite acute soft tissue findings are suggested.                                X-Ray Chest PA And Lateral (Final result)  Result time 07/12/22 08:53:15    Final result by Rk Avina MD (07/12/22 08:53:15)                 Impression:      No convincing evidence of acute cardiopulmonary disease.      Electronically signed by: Rk Avina  Date:    07/12/2022  Time:    08:53             Narrative:    EXAMINATION:  XR CHEST PA AND LATERAL    CLINICAL HISTORY:  Other chest pain    TECHNIQUE:  PA and lateral views of the chest were performed.    COMPARISON:  Chest radiograph 06/25/2021    FINDINGS:  Cardiomediastinal silhouette appears to be within normal limits.  The lungs appear essentially clear.  No definite pneumothorax or large volume pleural effusion.  No acute findings identified in the visualized abdomen.  Osseous and soft tissue structures appear without definite acute abnormality.                                 Medications   gi cocktail (mylanta 30 mL, LIDOcaine 2 % viscous 10 mL) 40 mL ( Oral Given 7/12/22 0745)   acetaminophen tablet 1,000 mg (1,000 mg Oral Given 7/12/22 0745)   methocarbamoL tablet 1,000 mg (1,000 mg Oral Given 7/12/22 0745)   HYDROmorphone (PF) injection 0.5 mg (0.5 mg Intravenous Given 7/12/22 1026)   sodium chloride 0.9% bolus 1,000 mL (1,000 mLs Intravenous New Bag 7/12/22 1025)   iohexoL (OMNIPAQUE 350) injection 100 mL (100 mLs Intravenous Given 7/12/22 1041)     Medical Decision Making:   History:   Old Medical Records: I decided to obtain old medical records.  Initial Assessment:   Malika Johnson is a 41 y.o. female with Hx of Graves disease who presents to the ED for chief complaint of back pain onset yesterday with associated upper abdominal pain and chest tightness that feels like anxiety.  Differential Diagnosis:   Includes not limited to lumbar strain versus constipation versus nephro pathology versus metabolic derangement versus cardiac cause versus anxiety versus gastritis versus pancreatitis versus PE (not  suspected, perc negative).  Independently Interpreted Test(s):   I have ordered and independently interpreted X-rays - see prior notes.  I have ordered and independently interpreted EKG Reading(s) - see prior notes  Clinical Tests:   Lab Tests: Ordered and Reviewed  Radiological Study: Ordered and Reviewed  Medical Tests: Ordered and Reviewed  ED Management:  Exam concerning for lumbar strain with anxiety and possible constipation.  Will get abdominal/cardiac workup and treat symptoms.  Will reassess.  Disposition pending results.    8:01 AM 7/12/2022  Rafaela Drake MD    Update note:  With reassessment, patient states her low back pain has improved and she had some brief relief with a GI cocktail but now her upper abdominal pain and chest tightness has returned mildly.  Patient workup shows a leukocytosis of 14.5.  Cardiac markers and chemistry unremarkable.  Abdominal x-ray positive for stool but otherwise unremarkable.  Chest x-ray with no acute changes.  Will treat additional pain get CT abdomen pelvis.  Will hydrate.  Will reassess.  Disposition pending results.    10:57 AM 7/12/2022  Rafaela Drake MD    Update note:  With reassessment, patient states she feels improved.  Patient CT scan negative for any acute changes with incidental findings of gallstones along with a left ovarian cyst.  Symptoms likely due to gastritis, constipation and lumbar strain.  Will discharge with symptomatic treatment.  Discussed bland diet instructions.  Will give outpatient follow-up return precautions.  Patient verbalized understanding agrees plan of care.    12:01 PM 7/12/2022  Rafaela Drake MD                DISCLAIMER: This note was prepared with [employer] voice recognition transcription software. Garbled syntax, mangled pronouns, and other bizarre constructions may be attributed to that software system             Scribe Attestation:   Scribe #1: I performed the above scribed service and the documentation accurately  describes the services I performed. I attest to the accuracy of the note.               I, Rafaela Drake MD, personally performed the services described in this documentation.  All medical record entries made by the scribe were at my direction and in my presence.  I have reviewed the chart and agree that the record reflects my personal performance and is accurate and complete.  Clinical Impression:   Final diagnoses:  [R07.89] Chest pressure  [R07.89] Chest tightness  [S39.012A] Strain of lumbar region, initial encounter (Primary)  [K29.00] Acute superficial gastritis without hemorrhage  [K59.00] Constipation, unspecified constipation type          ED Disposition Condition    Discharge Stable        ED Prescriptions     Medication Sig Dispense Start Date End Date Auth. Provider    omeprazole (PRILOSEC) 20 MG capsule Take 1 capsule (20 mg total) by mouth once daily. 30 capsule 7/12/2022 9/10/2022 Rafaela Drake MD    docusate sodium (COLACE) 100 MG capsule Take 1 capsule (100 mg total) by mouth 2 (two) times daily. for 10 days 20 capsule 7/12/2022 7/22/2022 Rafaela Drake MD    polyethylene glycol (GLYCOLAX) 17 gram PwPk Take 17 g by mouth once daily. for 3 days 3 packet 7/12/2022 7/15/2022 Rafaela Drake MD    methocarbamoL (ROBAXIN) 750 MG Tab Take 1 tablet (750 mg total) by mouth 3 (three) times daily. for 5 days 15 tablet 7/12/2022 7/17/2022 Rafaela Drake MD    acetaminophen (TYLENOL) 500 MG tablet Take 2 tablets (1,000 mg total) by mouth every 8 (eight) hours as needed for Pain. 30 tablet 7/12/2022 7/17/2022 Rafaela Drake MD        Follow-up Information     Follow up With Specialties Details Why Contact [facility] ED Emergency Medicine  As needed, If symptoms worsen [address] [facility] [id number] [phone]    Mildred Gonzalez MD Family Medicine Schedule an appointment as soon as possible for a visit in 1 day  [address] [facility]  [place]  [id number]  [phone]             Lydia Drake MD  07/12/22 0502

## 2023-04-12 ENCOUNTER — PATIENT MESSAGE (OUTPATIENT)
Dept: ADMINISTRATIVE | Facility: HOSPITAL | Age: 43
End: 2023-04-12
Payer: COMMERCIAL

## 2023-07-07 ENCOUNTER — PATIENT OUTREACH (OUTPATIENT)
Dept: ADMINISTRATIVE | Facility: HOSPITAL | Age: 43
End: 2023-07-07
Payer: COMMERCIAL

## 2024-09-14 ENCOUNTER — HOSPITAL ENCOUNTER (INPATIENT)
Facility: HOSPITAL | Age: 44
LOS: 1 days | Discharge: LEFT BEFORE DISCHARGE | DRG: 854 | End: 2024-09-16
Attending: EMERGENCY MEDICINE | Admitting: STUDENT IN AN ORGANIZED HEALTH CARE EDUCATION/TRAINING PROGRAM
Payer: COMMERCIAL

## 2024-09-14 ENCOUNTER — ANESTHESIA EVENT (OUTPATIENT)
Dept: SURGERY | Facility: HOSPITAL | Age: 44
DRG: 854 | End: 2024-09-14
Payer: COMMERCIAL

## 2024-09-14 DIAGNOSIS — R03.0 ELEVATED BLOOD PRESSURE READING: ICD-10-CM

## 2024-09-14 DIAGNOSIS — K81.0 ACUTE CHOLECYSTITIS: Primary | ICD-10-CM

## 2024-09-14 DIAGNOSIS — R07.9 CHEST PAIN: ICD-10-CM

## 2024-09-14 PROBLEM — D72.829 LEUKOCYTOSIS: Status: ACTIVE | Noted: 2024-09-14

## 2024-09-14 PROBLEM — F98.8 ATTENTION DEFICIT DISORDER (ADD) IN ADULT: Status: ACTIVE | Noted: 2023-03-28

## 2024-09-14 PROBLEM — I10 PRIMARY HYPERTENSION: Status: ACTIVE | Noted: 2022-05-10

## 2024-09-14 PROBLEM — E66.9 OBESITY: Status: ACTIVE | Noted: 2022-05-10

## 2024-09-14 LAB
ALBUMIN SERPL BCP-MCNC: 3.3 G/DL (ref 3.5–5.2)
ALBUMIN SERPL-MCNC: 3.7 G/DL (ref 3.3–5.5)
ALBUMIN SERPL-MCNC: 3.8 G/DL (ref 3.3–5.5)
ALLENS TEST: ABNORMAL
ALP SERPL-CCNC: 82 U/L (ref 42–141)
ALP SERPL-CCNC: 83 U/L (ref 55–135)
ALP SERPL-CCNC: 86 U/L (ref 42–141)
ALT SERPL W/O P-5'-P-CCNC: 25 U/L (ref 10–44)
ANION GAP SERPL CALC-SCNC: 13 MMOL/L (ref 8–16)
AST SERPL-CCNC: 21 U/L (ref 10–40)
B-HCG UR QL: NEGATIVE
BILIRUB SERPL-MCNC: 0.8 MG/DL (ref 0.2–1.6)
BILIRUB SERPL-MCNC: 0.8 MG/DL (ref 0.2–1.6)
BILIRUB SERPL-MCNC: 1.3 MG/DL (ref 0.1–1)
BILIRUBIN, POC UA: NEGATIVE
BLOOD, POC UA: ABNORMAL
BUN SERPL-MCNC: 7 MG/DL (ref 6–20)
BUN SERPL-MCNC: 7 MG/DL (ref 7–22)
CALCIUM SERPL-MCNC: 8.4 MG/DL (ref 8.7–10.5)
CALCIUM SERPL-MCNC: 9.3 MG/DL (ref 8–10.3)
CHLORIDE SERPL-SCNC: 101 MMOL/L (ref 98–108)
CHLORIDE SERPL-SCNC: 104 MMOL/L (ref 95–110)
CLARITY, UA: CLEAR
CO2 SERPL-SCNC: 19 MMOL/L (ref 23–29)
COLOR, UA: YELLOW
CREAT SERPL-MCNC: 0.5 MG/DL (ref 0.6–1.2)
CREAT SERPL-MCNC: 0.8 MG/DL (ref 0.5–1.4)
CTP QC/QA: YES
ERYTHROCYTE [DISTWIDTH] IN BLOOD BY AUTOMATED COUNT: 13 % (ref 11.5–14.5)
EST. GFR  (NO RACE VARIABLE): >60 ML/MIN/1.73 M^2
GLUCOSE SERPL-MCNC: 109 MG/DL (ref 73–118)
GLUCOSE SERPL-MCNC: 99 MG/DL (ref 70–110)
GLUCOSE, POC UA: NEGATIVE
HCO3 UR-SCNC: 24.8 MMOL/L (ref 24–28)
HCT VFR BLD AUTO: 41.1 % (ref 37–48.5)
HCT, POC: NORMAL
HGB BLD-MCNC: 13.6 G/DL (ref 12–16)
HGB, POC: NORMAL (ref 14–18)
KETONES, POC UA: NEGATIVE
LDH SERPL L TO P-CCNC: 2.76 MMOL/L (ref 0.5–2.2)
LEUKOCYTE EST, POC UA: NEGATIVE
MCH RBC QN AUTO: 30.8 PG (ref 27–31)
MCH, POC: NORMAL
MCHC RBC AUTO-ENTMCNC: 33.1 G/DL (ref 32–36)
MCHC, POC: NORMAL
MCV RBC AUTO: 93 FL (ref 82–98)
MCV, POC: NORMAL
MPV, POC: NORMAL
NITRITE, POC UA: NEGATIVE
OHS QRS DURATION: 76 MS
OHS QTC CALCULATION: 430 MS
PCO2 BLDA: 48.1 MMHG (ref 35–45)
PH SMN: 7.32 [PH] (ref 7.35–7.45)
PH UR STRIP: 5.5 [PH] (ref 5–8)
PLATELET # BLD AUTO: 294 K/UL (ref 150–450)
PMV BLD AUTO: 9.6 FL (ref 9.2–12.9)
PO2 BLDA: 18 MMHG (ref 40–60)
POC ALT (SGPT): 24 U/L (ref 10–47)
POC ALT (SGPT): 24 U/L (ref 10–47)
POC AMYLASE: 45 U/L (ref 14–97)
POC AST (SGOT): 25 U/L (ref 11–38)
POC AST (SGOT): 28 U/L (ref 11–38)
POC BE: -2 MMOL/L
POC CARDIAC TROPONIN I: 0 NG/ML (ref 0–0.08)
POC GGT: 41 U/L (ref 5–65)
POC PLATELET COUNT: NORMAL
POC SATURATED O2: 24 % (ref 95–100)
POC TCO2: 26 MMOL/L (ref 24–29)
POC TCO2: 27 MMOL/L (ref 18–33)
POTASSIUM BLD-SCNC: 4 MMOL/L (ref 3.6–5.1)
POTASSIUM SERPL-SCNC: 3.5 MMOL/L (ref 3.5–5.1)
PROT SERPL-MCNC: 7.5 G/DL (ref 6–8.4)
PROTEIN, POC UA: NEGATIVE
PROTEIN, POC: 7.9 G/DL (ref 6.4–8.1)
PROTEIN, POC: 8.1 G/DL (ref 6.4–8.1)
RBC # BLD AUTO: 4.41 M/UL (ref 4–5.4)
RBC, POC: NORMAL
RDW, POC: NORMAL
SAMPLE: ABNORMAL
SAMPLE: NORMAL
SITE: ABNORMAL
SODIUM BLD-SCNC: 137 MMOL/L (ref 128–145)
SODIUM SERPL-SCNC: 136 MMOL/L (ref 136–145)
SPECIFIC GRAVITY, POC UA: 1.02 (ref 1–1.03)
UROBILINOGEN, POC UA: 0.2 E.U./DL
WBC # BLD AUTO: 23.31 K/UL (ref 3.9–12.7)
WBC, POC: NORMAL

## 2024-09-14 PROCEDURE — 96376 TX/PRO/DX INJ SAME DRUG ADON: CPT

## 2024-09-14 PROCEDURE — 96361 HYDRATE IV INFUSION ADD-ON: CPT

## 2024-09-14 PROCEDURE — 94761 N-INVAS EAR/PLS OXIMETRY MLT: CPT | Mod: XB

## 2024-09-14 PROCEDURE — 85027 COMPLETE CBC AUTOMATED: CPT | Performed by: NURSE PRACTITIONER

## 2024-09-14 PROCEDURE — 25000003 PHARM REV CODE 250: Performed by: NURSE PRACTITIONER

## 2024-09-14 PROCEDURE — 81025 URINE PREGNANCY TEST: CPT | Mod: ER

## 2024-09-14 PROCEDURE — G0378 HOSPITAL OBSERVATION PER HR: HCPCS

## 2024-09-14 PROCEDURE — 96375 TX/PRO/DX INJ NEW DRUG ADDON: CPT | Mod: ER

## 2024-09-14 PROCEDURE — 93010 ELECTROCARDIOGRAM REPORT: CPT | Mod: ,,, | Performed by: INTERNAL MEDICINE

## 2024-09-14 PROCEDURE — 99900035 HC TECH TIME PER 15 MIN (STAT)

## 2024-09-14 PROCEDURE — 63600175 PHARM REV CODE 636 W HCPCS: Mod: ER | Performed by: EMERGENCY MEDICINE

## 2024-09-14 PROCEDURE — G0378 HOSPITAL OBSERVATION PER HR: HCPCS | Mod: ER

## 2024-09-14 PROCEDURE — 36415 COLL VENOUS BLD VENIPUNCTURE: CPT | Performed by: NURSE PRACTITIONER

## 2024-09-14 PROCEDURE — 25000003 PHARM REV CODE 250: Mod: ER | Performed by: EMERGENCY MEDICINE

## 2024-09-14 PROCEDURE — 80053 COMPREHEN METABOLIC PANEL: CPT | Performed by: NURSE PRACTITIONER

## 2024-09-14 PROCEDURE — 82040 ASSAY OF SERUM ALBUMIN: CPT | Mod: ER

## 2024-09-14 PROCEDURE — 63600175 PHARM REV CODE 636 W HCPCS: Performed by: NURSE PRACTITIONER

## 2024-09-14 PROCEDURE — 96375 TX/PRO/DX INJ NEW DRUG ADDON: CPT

## 2024-09-14 PROCEDURE — 96365 THER/PROPH/DIAG IV INF INIT: CPT | Mod: ER

## 2024-09-14 PROCEDURE — 81003 URINALYSIS AUTO W/O SCOPE: CPT | Mod: ER

## 2024-09-14 PROCEDURE — 82803 BLOOD GASES ANY COMBINATION: CPT | Mod: ER

## 2024-09-14 PROCEDURE — 85025 COMPLETE CBC W/AUTO DIFF WBC: CPT | Mod: ER

## 2024-09-14 PROCEDURE — 80053 COMPREHEN METABOLIC PANEL: CPT | Mod: ER

## 2024-09-14 PROCEDURE — 81025 URINE PREGNANCY TEST: CPT | Mod: ER | Performed by: EMERGENCY MEDICINE

## 2024-09-14 PROCEDURE — 25000003 PHARM REV CODE 250: Performed by: SURGERY

## 2024-09-14 PROCEDURE — 84484 ASSAY OF TROPONIN QUANT: CPT | Mod: ER

## 2024-09-14 PROCEDURE — 96361 HYDRATE IV INFUSION ADD-ON: CPT | Mod: ER

## 2024-09-14 PROCEDURE — 99285 EMERGENCY DEPT VISIT HI MDM: CPT | Mod: 25,ER

## 2024-09-14 PROCEDURE — 93005 ELECTROCARDIOGRAM TRACING: CPT | Mod: ER

## 2024-09-14 RX ORDER — MORPHINE SULFATE 4 MG/ML
4 INJECTION, SOLUTION INTRAMUSCULAR; INTRAVENOUS
Status: DISCONTINUED | OUTPATIENT
Start: 2024-09-14 | End: 2024-09-14

## 2024-09-14 RX ORDER — MORPHINE SULFATE 4 MG/ML
2 INJECTION, SOLUTION INTRAMUSCULAR; INTRAVENOUS EVERY 4 HOURS PRN
Status: DISCONTINUED | OUTPATIENT
Start: 2024-09-14 | End: 2024-09-14

## 2024-09-14 RX ORDER — IBUPROFEN 200 MG
16 TABLET ORAL
Status: DISCONTINUED | OUTPATIENT
Start: 2024-09-14 | End: 2024-09-16 | Stop reason: HOSPADM

## 2024-09-14 RX ORDER — FAMOTIDINE 10 MG/ML
40 INJECTION INTRAVENOUS
Status: COMPLETED | OUTPATIENT
Start: 2024-09-14 | End: 2024-09-14

## 2024-09-14 RX ORDER — PANTOPRAZOLE SODIUM 40 MG/10ML
40 INJECTION, POWDER, LYOPHILIZED, FOR SOLUTION INTRAVENOUS DAILY
Status: DISCONTINUED | OUTPATIENT
Start: 2024-09-15 | End: 2024-09-16 | Stop reason: HOSPADM

## 2024-09-14 RX ORDER — SODIUM CHLORIDE 9 MG/ML
INJECTION, SOLUTION INTRAVENOUS CONTINUOUS
Status: DISCONTINUED | OUTPATIENT
Start: 2024-09-14 | End: 2024-09-14

## 2024-09-14 RX ORDER — ONDANSETRON HYDROCHLORIDE 2 MG/ML
4 INJECTION, SOLUTION INTRAVENOUS EVERY 8 HOURS PRN
Status: DISCONTINUED | OUTPATIENT
Start: 2024-09-14 | End: 2024-09-16 | Stop reason: HOSPADM

## 2024-09-14 RX ORDER — ACETAMINOPHEN 325 MG/1
650 TABLET ORAL EVERY 6 HOURS PRN
Status: DISCONTINUED | OUTPATIENT
Start: 2024-09-14 | End: 2024-09-15

## 2024-09-14 RX ORDER — SODIUM CHLORIDE 0.9 % (FLUSH) 0.9 %
10 SYRINGE (ML) INJECTION EVERY 8 HOURS PRN
Status: DISCONTINUED | OUTPATIENT
Start: 2024-09-14 | End: 2024-09-16 | Stop reason: HOSPADM

## 2024-09-14 RX ORDER — NALOXONE HCL 0.4 MG/ML
0.02 VIAL (ML) INJECTION
Status: DISCONTINUED | OUTPATIENT
Start: 2024-09-14 | End: 2024-09-16 | Stop reason: HOSPADM

## 2024-09-14 RX ORDER — SODIUM CHLORIDE 9 MG/ML
1000 INJECTION, SOLUTION INTRAVENOUS CONTINUOUS
Status: ACTIVE | OUTPATIENT
Start: 2024-09-14 | End: 2024-09-15

## 2024-09-14 RX ORDER — PROCHLORPERAZINE EDISYLATE 5 MG/ML
5 INJECTION INTRAMUSCULAR; INTRAVENOUS EVERY 6 HOURS PRN
Status: DISCONTINUED | OUTPATIENT
Start: 2024-09-14 | End: 2024-09-16 | Stop reason: HOSPADM

## 2024-09-14 RX ORDER — METOCLOPRAMIDE HYDROCHLORIDE 5 MG/ML
10 INJECTION INTRAMUSCULAR; INTRAVENOUS
Status: COMPLETED | OUTPATIENT
Start: 2024-09-14 | End: 2024-09-14

## 2024-09-14 RX ORDER — DIPHENHYDRAMINE HYDROCHLORIDE 50 MG/ML
25 INJECTION INTRAMUSCULAR; INTRAVENOUS
Status: COMPLETED | OUTPATIENT
Start: 2024-09-14 | End: 2024-09-14

## 2024-09-14 RX ORDER — IBUPROFEN 200 MG
24 TABLET ORAL
Status: DISCONTINUED | OUTPATIENT
Start: 2024-09-14 | End: 2024-09-16 | Stop reason: HOSPADM

## 2024-09-14 RX ORDER — MORPHINE SULFATE 4 MG/ML
4 INJECTION, SOLUTION INTRAMUSCULAR; INTRAVENOUS
Status: COMPLETED | OUTPATIENT
Start: 2024-09-14 | End: 2024-09-14

## 2024-09-14 RX ORDER — IPRATROPIUM BROMIDE AND ALBUTEROL SULFATE 2.5; .5 MG/3ML; MG/3ML
3 SOLUTION RESPIRATORY (INHALATION) EVERY 4 HOURS PRN
Status: DISCONTINUED | OUTPATIENT
Start: 2024-09-14 | End: 2024-09-16 | Stop reason: HOSPADM

## 2024-09-14 RX ORDER — DEXTROAMPHETAMINE SACCHARATE, AMPHETAMINE ASPARTATE MONOHYDRATE, DEXTROAMPHETAMINE SULFATE AND AMPHETAMINE SULFATE 5; 5; 5; 5 MG/1; MG/1; MG/1; MG/1
20 CAPSULE, EXTENDED RELEASE ORAL EVERY MORNING
COMMUNITY

## 2024-09-14 RX ORDER — KETOROLAC TROMETHAMINE 30 MG/ML
15 INJECTION, SOLUTION INTRAMUSCULAR; INTRAVENOUS
Status: COMPLETED | OUTPATIENT
Start: 2024-09-14 | End: 2024-09-14

## 2024-09-14 RX ORDER — GLUCAGON 1 MG
1 KIT INJECTION
Status: DISCONTINUED | OUTPATIENT
Start: 2024-09-14 | End: 2024-09-16 | Stop reason: HOSPADM

## 2024-09-14 RX ORDER — HYDROMORPHONE HYDROCHLORIDE 1 MG/ML
0.5 INJECTION, SOLUTION INTRAMUSCULAR; INTRAVENOUS; SUBCUTANEOUS EVERY 4 HOURS PRN
Status: DISCONTINUED | OUTPATIENT
Start: 2024-09-14 | End: 2024-09-15

## 2024-09-14 RX ORDER — ONDANSETRON HYDROCHLORIDE 2 MG/ML
8 INJECTION, SOLUTION INTRAVENOUS
Status: COMPLETED | OUTPATIENT
Start: 2024-09-14 | End: 2024-09-14

## 2024-09-14 RX ADMIN — SODIUM CHLORIDE 1000 ML: 9 INJECTION, SOLUTION INTRAVENOUS at 07:09

## 2024-09-14 RX ADMIN — ACETAMINOPHEN 650 MG: 325 TABLET ORAL at 05:09

## 2024-09-14 RX ADMIN — DIPHENHYDRAMINE HYDROCHLORIDE 25 MG: 50 INJECTION INTRAMUSCULAR; INTRAVENOUS at 11:09

## 2024-09-14 RX ADMIN — KETOROLAC TROMETHAMINE 15 MG: 30 INJECTION, SOLUTION INTRAMUSCULAR; INTRAVENOUS at 07:09

## 2024-09-14 RX ADMIN — HYDROMORPHONE HYDROCHLORIDE 0.5 MG: 1 INJECTION, SOLUTION INTRAMUSCULAR; INTRAVENOUS; SUBCUTANEOUS at 04:09

## 2024-09-14 RX ADMIN — PIPERACILLIN AND TAZOBACTAM 4.5 G: 4; .5 INJECTION, POWDER, LYOPHILIZED, FOR SOLUTION INTRAVENOUS; PARENTERAL at 12:09

## 2024-09-14 RX ADMIN — ACETAMINOPHEN 650 MG: 325 TABLET ORAL at 10:09

## 2024-09-14 RX ADMIN — SODIUM CHLORIDE 500 ML: 9 INJECTION, SOLUTION INTRAVENOUS at 06:09

## 2024-09-14 RX ADMIN — ONDANSETRON 8 MG: 2 INJECTION INTRAMUSCULAR; INTRAVENOUS at 07:09

## 2024-09-14 RX ADMIN — PIPERACILLIN AND TAZOBACTAM 4.5 G: 4; .5 INJECTION, POWDER, LYOPHILIZED, FOR SOLUTION INTRAVENOUS; PARENTERAL at 09:09

## 2024-09-14 RX ADMIN — METOCLOPRAMIDE HYDROCHLORIDE 10 MG: 5 INJECTION INTRAMUSCULAR; INTRAVENOUS at 11:09

## 2024-09-14 RX ADMIN — MORPHINE SULFATE 4 MG: 4 INJECTION, SOLUTION INTRAMUSCULAR; INTRAVENOUS at 10:09

## 2024-09-14 RX ADMIN — SODIUM CHLORIDE 1000 ML: 9 INJECTION, SOLUTION INTRAVENOUS at 11:09

## 2024-09-14 RX ADMIN — FAMOTIDINE 40 MG: 10 INJECTION, SOLUTION INTRAVENOUS at 07:09

## 2024-09-14 RX ADMIN — SODIUM CHLORIDE 1000 ML: 9 INJECTION, SOLUTION INTRAVENOUS at 02:09

## 2024-09-14 NOTE — H&P
"[facility] Medicine  History & Physical    Patient Name: Malika Johsnon  MRN: [id number]  Patient Class: OP- Observation  Admission Date: 2024  Attending Physician: Saturnino Sin MD   Primary Care Provider: Kev Slater MD         Patient information was obtained from patient and ER records.     Subjective:     Principal Problem:Acute cholecystitis    Chief Complaint:   Chief Complaint   Patient presents with    Abdominal Pain     Pt c/o abd pain/bloating starting 2-3 days ago        HPI: 44 year old female with past medical history of Graves disease presented to the ED at [facility] with c/o abdominal pain since last night. Patient describes her pain as "tightness" in character across her entire abdomen. She reports NVD last week which resolved. However, she started experiencing nausea again last night and was unable to consume any food as a result. She had a soft BM yesterday after taking a laxative. No other exacerbating or alleviating factors. Denies chest pain, or other associated symptoms. In the ED, WBC 19.9 Lactic acid is elevated at 2.76. US shows: Cholelithiasis with sonographic findings suggesting cholecystitis. Liver findings suggestive of intrinsic liver disease/steatosis. Ill-defined hypoechoic liver mass.  EKG showed NSR 84 bpm. Patient transferred to [facility] for evaluation by General surgery.     Past Medical History:   Diagnosis Date    Graves disease     Mastitis     left breast       Past Surgical History:   Procedure Laterality Date    APPENDECTOMY      BREAST SURGERY  2003    left breast     SECTION      x1    THYROIDECTOMY         Review of patient's allergies indicates:   Allergen Reactions    Amoxicillin Hives       No current facility-administered medications on file prior to encounter.     Current Outpatient Medications on File Prior to Encounter   Medication Sig    azithromycin (Z-JUAN) 250 MG tablet Take 1 tablet (250 mg total) by mouth once " daily. Take first 2 tablets together, then 1 every day until finished.    betamethasone valerate 0.1% (VALISONE) 0.1 % Crea Apply topically 2 (two) times daily.    CALCIUM ORAL Take by mouth.    dextroamphetamine-amphetamine (ADDERALL XR) 20 MG 24 hr capsule Take 20 mg by mouth every morning.    ferrous sulfate (IRON ORAL) Take by mouth.    fluticasone propionate (FLONASE) 50 mcg/actuation nasal spray 2 sprays (100 mcg total) by Each Nostril route once daily.    loratadine (CLARITIN) 10 mg tablet Take 1 tablet (10 mg total) by mouth every morning.    multivit-min-ferrous sulfate 15 mg iron Tab Take by mouth.    MULTIVITAMIN ORAL Take by mouth.    norethindrone (MICRONOR) 0.35 mg tablet Take 1 tablet by mouth once daily.    omeprazole (PRILOSEC) 20 MG capsule Take 1 capsule (20 mg total) by mouth once daily.     Family History       Problem Relation (Age of Onset)    Cancer Paternal Aunt    Liver cancer Paternal Grandmother    Lung cancer Paternal Uncle          Tobacco Use    Smoking status: Never    Smokeless tobacco: Never   Substance and Sexual Activity    Alcohol use: Yes    Drug use: No    Sexual activity: Not Currently     Partners: Male     Review of Systems   Gastrointestinal:  Positive for abdominal pain, diarrhea, nausea and vomiting.   All other systems reviewed and are negative.    Objective:     Vital Signs (Most Recent):  Temp: 98.4 °F (36.9 °C) (09/14/24 1400)  Pulse: 100 (09/14/24 1400)  Resp: 18 (09/14/24 1400)  BP: (!) 166/95 (09/14/24 1400)  SpO2: 98 % (09/14/24 1400) Vital Signs (24h Range):  Temp:  [98.4 °F (36.9 °C)-98.5 °F (36.9 °C)] 98.4 °F (36.9 °C)  Pulse:  [] 100  Resp:  [18-20] 18  SpO2:  [97 %-99 %] 98 %  BP: (126-169)/(67-95) 166/95     Weight: 113.4 kg (250 lb)  Body mass index is 50.49 kg/m².     Physical Exam  Constitutional:       General: She is not in acute distress.     Appearance: She is obese. She is not ill-appearing.   HENT:      Head: Normocephalic and atraumatic.       Mouth/Throat:      Mouth: Mucous membranes are dry.   Eyes:      Extraocular Movements: Extraocular movements intact.   Cardiovascular:      Rate and Rhythm: Normal rate and regular rhythm.   Abdominal:      Palpations: Abdomen is soft.      Comments: Epigastric pain    Musculoskeletal:         General: Normal range of motion.      Cervical back: Normal range of motion.   Skin:     General: Skin is warm and dry.   Neurological:      Mental Status: She is alert and oriented to person, place, and time. Mental status is at baseline.   Psychiatric:         Mood and Affect: Mood normal.         Behavior: Behavior normal.         Thought Content: Thought content normal.         Judgment: Judgment normal.                Significant Labs: All pertinent labs within the past 24 hours have been reviewed.    Significant Imaging: I have reviewed all pertinent imaging results/findings within the past 24 hours.  Assessment/Plan:     * Acute cholecystitis  Consult general surgery  NPO   IVF   Start on prophylaxis Zosyn  Pain control   Antiemetics as needed         Sepsis  This patient does have evidence of infective focus  My overall impression is sepsis.  Source:  acute cholecystitis   Antibiotics given-   Antibiotics (72h ago, onward)      Start     Stop Route Frequency Ordered    09/14/24 2100  piperacillin-tazobactam (ZOSYN) 4.5 g in D5W 100 mL IVPB (MB+)         -- IV Every 8 hours (non-standard times) 09/14/24 1533          Latest lactate reviewed-  Recent Labs   Lab 09/14/24  1158   POCLAC 2.76*     Organ dysfunction indicated by Acute liver injury    Fluid challenge Ideal Body Weight- The patient's ideal body weight is Ideal body weight: 48.8 kg (107 lb 8.4 oz) which will be used to calculate fluid bolus of 30 ml/kg for treatment of septic shock.      Post- resuscitation assessment Yes Perfusion exam was performed within 6 hours of septic shock presentation after bolus shows Adequate tissue perfusion assessed by  non-invasive monitoring       Will Not start Pressors- Levophed for MAP of 65        Abnormal US (ultrasound) of abdomen  US of abdomen showed Ill-defined hypoechoic liver mass   Will order CT abdomen      Leukocytosis  Secondary to above  Trend level       Obesity  Body mass index is 50.49 kg/m². Morbid obesity complicates all aspects of disease management from diagnostic modalities to treatment. Weight loss encouraged and health benefits explained to patient.         Primary hypertension  History of hypertension  Not on any medication   Will monitor for now  Likely elevated due to pain     Attention deficit disorder (ADD) in adult  PTA Adderrall on hold       Graves disease  Doesn't take any medication  Follows endocrinologist outpatient         VTE Risk Mitigation (From admission, onward)           Ordered     IP VTE HIGH RISK PATIENT  Once         09/14/24 1519     Place sequential compression device  Until discontinued         09/14/24 1519     Reason for No Pharmacological VTE Prophylaxis  Once        Question:  Reasons:  Answer:  Risk of Bleeding    09/14/24 1519                         On 09/14/2024, patient should be placed in hospital observation services under my care in collaboration with Dr. Christy Cross NP  Department of Hospital Medicine  [facility] [place] [facility] S[facility]

## 2024-09-14 NOTE — SUBJECTIVE & OBJECTIVE
Past Medical History:   Diagnosis Date    Graves disease     Mastitis     left breast       Past Surgical History:   Procedure Laterality Date    APPENDECTOMY      BREAST SURGERY  2003    left breast     SECTION      x1    THYROIDECTOMY         Review of patient's allergies indicates:   Allergen Reactions    Amoxicillin Hives       No current facility-administered medications on file prior to encounter.     Current Outpatient Medications on File Prior to Encounter   Medication Sig    azithromycin (Z-JUAN) 250 MG tablet Take 1 tablet (250 mg total) by mouth once daily. Take first 2 tablets together, then 1 every day until finished.    betamethasone valerate 0.1% (VALISONE) 0.1 % Crea Apply topically 2 (two) times daily.    CALCIUM ORAL Take by mouth.    dextroamphetamine-amphetamine (ADDERALL XR) 20 MG 24 hr capsule Take 20 mg by mouth every morning.    ferrous sulfate (IRON ORAL) Take by mouth.    fluticasone propionate (FLONASE) 50 mcg/actuation nasal spray 2 sprays (100 mcg total) by Each Nostril route once daily.    loratadine (CLARITIN) 10 mg tablet Take 1 tablet (10 mg total) by mouth every morning.    multivit-min-ferrous sulfate 15 mg iron Tab Take by mouth.    MULTIVITAMIN ORAL Take by mouth.    norethindrone (MICRONOR) 0.35 mg tablet Take 1 tablet by mouth once daily.    omeprazole (PRILOSEC) 20 MG capsule Take 1 capsule (20 mg total) by mouth once daily.     Family History       Problem Relation (Age of Onset)    Cancer Paternal Aunt    Liver cancer Paternal Grandmother    Lung cancer Paternal Uncle          Tobacco Use    Smoking status: Never    Smokeless tobacco: Never   Substance and Sexual Activity    Alcohol use: Yes    Drug use: No    Sexual activity: Not Currently     Partners: Male     Review of Systems   Gastrointestinal:  Positive for abdominal pain, diarrhea, nausea and vomiting.   All other systems reviewed and are negative.    Objective:     Vital Signs (Most Recent):  Temp: 98.4 °F  (36.9 °C) (09/14/24 1400)  Pulse: 100 (09/14/24 1400)  Resp: 18 (09/14/24 1400)  BP: (!) 166/95 (09/14/24 1400)  SpO2: 98 % (09/14/24 1400) Vital Signs (24h Range):  Temp:  [98.4 °F (36.9 °C)-98.5 °F (36.9 °C)] 98.4 °F (36.9 °C)  Pulse:  [] 100  Resp:  [18-20] 18  SpO2:  [97 %-99 %] 98 %  BP: (126-169)/(67-95) 166/95     Weight: 113.4 kg (250 lb)  Body mass index is 50.49 kg/m².     Physical Exam  Constitutional:       General: She is not in acute distress.     Appearance: She is obese. She is not ill-appearing.   HENT:      Head: Normocephalic and atraumatic.      Mouth/Throat:      Mouth: Mucous membranes are dry.   Eyes:      Extraocular Movements: Extraocular movements intact.   Cardiovascular:      Rate and Rhythm: Normal rate and regular rhythm.   Abdominal:      Palpations: Abdomen is soft.      Comments: Epigastric pain    Musculoskeletal:         General: Normal range of motion.      Cervical back: Normal range of motion.   Skin:     General: Skin is warm and dry.   Neurological:      Mental Status: She is alert and oriented to person, place, and time. Mental status is at baseline.   Psychiatric:         Mood and Affect: Mood normal.         Behavior: Behavior normal.         Thought Content: Thought content normal.         Judgment: Judgment normal.                Significant Labs: All pertinent labs within the past 24 hours have been reviewed.    Significant Imaging: I have reviewed all pertinent imaging results/findings within the past 24 hours.

## 2024-09-14 NOTE — ANESTHESIA PREPROCEDURE EVALUATION
09/14/2024  Malika Johnson is a 44 y.o., female.      Pre-op Assessment    I have reviewed the Patient Summary Reports.     I have reviewed the Nursing Notes. I have reviewed the NPO Status.   I have reviewed the Medications.     Review of Systems  Anesthesia Hx:  No problems with previous Anesthesia             Denies Family Hx of Anesthesia complications.    Denies Personal Hx of Anesthesia complications.                    Social:  Non-Smoker, Social Alcohol Use       Hematology/Oncology:  Hematology Normal   Oncology Normal                                   Cardiovascular:     Hypertension                                        Pulmonary:  Pulmonary Normal                       Renal/:  Renal/ Normal                 Hepatic/GI:  Hepatic/GI Normal                 Neurological:  Neurology Normal                                      Endocrine:     Graves disease; normal thyroid hormones 04/24        Psych:  Psychiatric History   ADD               Physical Exam  General: Cooperative, Alert and Oriented    Airway:  Mallampati: III   Mouth Opening: Normal  TM Distance: Normal  Tongue: Normal  Neck ROM: Normal ROM    Dental:  Intact      Wt Readings from Last 3 Encounters:   09/14/24 113.4 kg (250 lb)   07/12/22 113.4 kg (250 lb)   05/07/22 117.9 kg (260 lb)     Temp Readings from Last 3 Encounters:   09/14/24 37.9 °C (100.2 °F) (Oral)   07/12/22 36.9 °C (98.4 °F) (Oral)   05/07/22 36.8 °C (98.2 °F) (Oral)     BP Readings from Last 3 Encounters:   09/14/24 (!) 144/91   07/12/22 (!) 162/95   05/07/22 (!) 172/106     Pulse Readings from Last 3 Encounters:   09/14/24 110   07/12/22 (!) 55   05/07/22 81     Lab Results   Component Value Date    WBC 8.70 08/24/2021    HGB 12.3 08/24/2021    HCT 39.4 08/24/2021    MCV 94 08/24/2021     08/24/2021       CMP  Sodium   Date Value Ref Range Status    08/24/2021 136 136 - 145 mmol/L Final     Potassium   Date Value Ref Range Status   08/24/2021 4.0 3.5 - 5.1 mmol/L Final     Chloride   Date Value Ref Range Status   08/24/2021 103 95 - 110 mmol/L Final     CO2   Date Value Ref Range Status   08/24/2021 22 (L) 23 - 29 mmol/L Final     Glucose   Date Value Ref Range Status   08/24/2021 96 70 - 110 mg/dL Final     BUN   Date Value Ref Range Status   08/24/2021 9 6 - 20 mg/dL Final     Creatinine   Date Value Ref Range Status   08/24/2021 0.7 0.5 - 1.4 mg/dL Final     Calcium   Date Value Ref Range Status   08/24/2021 9.3 8.7 - 10.5 mg/dL Final     Total Protein   Date Value Ref Range Status   08/24/2021 7.6 6.0 - 8.4 g/dL Final     Albumin   Date Value Ref Range Status   08/24/2021 3.8 3.5 - 5.2 g/dL Final     Total Bilirubin   Date Value Ref Range Status   08/24/2021 0.3 0.1 - 1.0 mg/dL Final     Comment:     For infants and newborns, interpretation of results should be based  on gestational age, weight and in agreement with clinical  observations.    Premature Infant recommended reference ranges:  Up to 24 hours.............<8.0 mg/dL  Up to 48 hours............<12.0 mg/dL  3-5 days..................<15.0 mg/dL  6-29 days.................<15.0 mg/dL       Alkaline Phosphatase   Date Value Ref Range Status   08/24/2021 79 55 - 135 U/L Final     AST   Date Value Ref Range Status   08/24/2021 26 10 - 40 U/L Final     ALT   Date Value Ref Range Status   08/24/2021 39 10 - 44 U/L Final     Anion Gap   Date Value Ref Range Status   08/24/2021 11 8 - 16 mmol/L Final         Anesthesia Plan  Type of Anesthesia, risks & benefits discussed:    Anesthesia Type: Gen ETT  Intra-op Monitoring Plan: Standard ASA Monitors  Post Op Pain Control Plan: multimodal analgesia  Induction:  IV  Airway Plan: Video, Post-Induction  Informed Consent: Patient consented to blood products? No  ASA Score: 3    Ready For Surgery From Anesthesia Perspective.     .

## 2024-09-14 NOTE — NURSING
[facility] [place]-  Rapid Response Nurse Intervention/ Task    Date of Visit: 09/14/2024  Time of Visit: 1720       INTERVENTIONS/ TASK Completed:     MEWS monitoring mews score 4 with . I went to bedside and HR rechecked and it was 121. Pt also with Temp 100.4. Order received for tylenol. Pt with NAD noted.

## 2024-09-14 NOTE — ED PROVIDER NOTES
"Encounter Date: 2024    SCRIBE #1 NOTE: I, Charo Medellin, am scribing for, and in the presence of,  Marielena Smith DO.       History     Chief Complaint   Patient presents with    Abdominal Pain     Pt c/o abd pain/bloating starting 2-3 days ago     43 yo F w/ PMHx of graves disease presenting to the ED for abdominal pain since last night. Patient describes her pain as "tightness" in character across her entire abdomen. She reports NVD last week which resolved. However, she started experiencing nausea again last night and was unable to consume any food as a result. She had a soft BM yesterday after taking a laxative. No other exacerbating or alleviating factors. Denies chest pain, or other associated symptoms.     The history is provided by the patient.     Review of patient's allergies indicates:   Allergen Reactions    Amoxicillin Hives     Past Medical History:   Diagnosis Date    Graves disease     Mastitis     left breast     Past Surgical History:   Procedure Laterality Date    APPENDECTOMY      BREAST SURGERY  2003    left breast     SECTION      x1    THYROIDECTOMY       Family History   Problem Relation Name Age of Onset    Liver cancer Paternal Grandmother      Lung cancer Paternal Uncle          smoked    Cancer Paternal Aunt          bone marrow     Social History     Tobacco Use    Smoking status: Never    Smokeless tobacco: Never   Substance Use Topics    Alcohol use: Yes    Drug use: No     Review of Systems   Constitutional:  Negative for chills and fever.   HENT:  Negative for congestion, rhinorrhea and sore throat.    Eyes:  Negative for visual disturbance.   Respiratory:  Negative for cough and shortness of breath.    Cardiovascular:  Negative for chest pain.   Gastrointestinal:  Positive for abdominal pain, diarrhea, nausea and vomiting.   Genitourinary:  Negative for dysuria, frequency and hematuria.   Musculoskeletal:  Negative for back pain.   Skin:  Negative for rash.   Neurological: "  Negative for dizziness, weakness and headaches.   Hematological:  Does not bruise/bleed easily.       Physical Exam     Initial Vitals [09/14/24 0622]   BP Pulse Resp Temp SpO2   (!) 142/95 99 20 98.4 °F (36.9 °C) 99 %      MAP       --         Physical Exam    Nursing note and vitals reviewed.  Constitutional: She appears well-developed and well-nourished. No distress.   HENT:   Head: Normocephalic and atraumatic.   Right Ear: External ear normal.   Left Ear: External ear normal.   Nose: Nose normal.   Eyes: EOM are normal. Pupils are equal, round, and reactive to light. Right eye exhibits no discharge. Left eye exhibits no discharge. No scleral icterus.   Neck: Neck supple.   Normal range of motion.  Cardiovascular:  Normal rate, regular rhythm, normal heart sounds and intact distal pulses.     Exam reveals no gallop and no friction rub.       No murmur heard.  Pulmonary/Chest: Breath sounds normal. No respiratory distress.   Abdominal: Abdomen is soft. There is no abdominal tenderness.   Obese. No guarding rebound rigidity There is no rebound and no guarding.   Musculoskeletal:         General: No tenderness or edema. Normal range of motion.      Cervical back: Normal range of motion and neck supple.     Neurological: She is alert and oriented to person, place, and time.   Skin: Skin is warm and dry. Capillary refill takes less than 2 seconds.   Psychiatric: She has a normal mood and affect.         ED Course   Critical Care    Date/Time: 9/14/2024 12:33 PM    Performed by: Marielena Smith DO  Authorized by: Marielena Smith DO  Direct patient critical care time: 8 minutes  Additional history critical care time: 8 minutes  Ordering / reviewing critical care time: 8 minutes  Documentation critical care time: 8 minutes  Consulting other physicians critical care time: 8 minutes  Total critical care time (exclusive of procedural time) : 40 minutes  Critical care was necessary to treat or prevent imminent or  life-threatening deterioration of the following conditions: sepsis.  Critical care was time spent personally by me on the following activities: evaluation of patient's response to treatment, examination of patient, obtaining history from patient or surrogate, ordering and performing treatments and interventions, ordering and review of laboratory studies, ordering and review of radiographic studies, pulse oximetry, re-evaluation of patient's condition and review of old charts.        Labs Reviewed   POCT URINALYSIS W/O SCOPE - Abnormal       Result Value    Glucose, UA Negative      Bilirubin, UA Negative      Ketones, UA Negative      Spec Grav UA 1.020      Blood, UA 1+ (*)     PH, UA 5.5      Protein, UA Negative      Urobilinogen, UA 0.2      Nitrite, UA Negative      Leukocytes, UA Negative      Color, UA POC Yellow      Clarity, UA, POC Clear     POCT CMP - Abnormal    Albumin, POC 3.7      Alkaline Phosphatase, POC 86      ALT (SGPT), POC 24      AST (SGOT), POC 25      POC BUN 7      Calcium, POC 9.3      POC Chloride 101      POC Creatinine 0.5 (*)     POC Glucose 109      POC Potassium 4.0      POC Sodium 137      Bilirubin, POC 0.8      POC TCO2 27      Protein, POC 7.9     ISTAT PROCEDURE - Abnormal    POC PH 7.320 (*)     POC PCO2 48.1 (*)     POC PO2 18 (*)     POC HCO3 24.8      POC BE -2      POC SATURATED O2 24      POC Lactate 2.76 (*)     POC TCO2 26      Sample VENOUS      Site Other      Allens Test N/A     TROPONIN ISTAT    POC Cardiac Troponin I 0.00      Sample unknown     POCT URINALYSIS W/O SCOPE   POCT URINE PREGNANCY    POC Preg Test, Ur Negative       Acceptable Yes     POCT CBC    Hematocrit        Hemoglobin        RBC        WBC        MCV        MCH, POC        MCHC        RDW-CV        Platelet Count, POC        MPV       POCT CMP   POCT LIVER PANEL   POCT TROPONIN   POCT LIVER PANEL    Albumin, POC 3.8      Alkaline Phosphatase, POC 82      ALT (SGPT), POC 24       Amylase, POC 45      AST (SGOT), POC 28      POC GGT 41      Bilirubin, POC 0.8      Protein, POC 8.1       EKG Readings: (Independently Interpreted)   EKG independently interpreted by Dr Smith.   NSR 84 bpm   Rightward axis   Abnromal EKG      When compared to Prev EKG 91349 increased by 27 bpm today.     ECG Results              EKG 12-lead (Final result)        Collection Time Result Time QRS Duration [facility] QTC Calculation    09/14/24 07:27:24 09/14/24 12:10:42 76 430                     Final result by Interface, Lab In [facility] (09/14/24 12:10:45)                   Narrative:    Test Reason : R03.0,    Vent. Rate : 084 BPM     Atrial Rate : 084 BPM     P-R Int : 164 ms          QRS Dur : 076 ms      QT Int : 364 ms       P-R-T Axes : 038 071 065 degrees     QTc Int : 430 ms    Normal sinus rhythm  Low voltage QRS  Borderline Abnormal ECG  When compared with ECG of 12-JUL-2022 07:17,  No significant change was found  Confirmed by Jamaal Luevano MD ([id number]) on 9/14/2024 12:10:40 PM    Referred By: [email or web address]   SELF           Confirmed By:Jamaal Luevano MD                                  Imaging Results              US Abdomen Limited (Final result)  Result time 09/14/24 10:44:49      Final result by Leonardo Sanchez MD (09/14/24 10:44:49)                   Impression:      Cholelithiasis with sonographic findings suggesting cholecystitis.    Liver findings suggestive of intrinsic liver disease/steatosis.  Ill-defined hypoechoic liver mass.  Nonemergent multiphase CT and/or MRI imaging recommended.      Electronically signed by: Leonardo Sanchez MD  Date:    09/14/2024  Time:    10:44               Narrative:    EXAMINATION:  US ABDOMEN LIMITED    CLINICAL HISTORY:  Abnormal CT;    TECHNIQUE:  Limited ultrasound of the right upper quadrant of the abdomen performed.    COMPARISON:  None.    FINDINGS:  Pancreas: Unremarkable as visualized    Gallbladder: 2 cm gallstone at the neck.  There is edematous  wall thickening and or trace pericholecystic fluid.    Biliary system: The common duct measures 5.7 mm.  No intrahepatic ductal dilatation.    Liver: Demonstrates mildly coarsened increased echotexture.  Irregular hypoechoic 5.9 x 1.7 x 2.6 cm lesion noted within the left lobe per technologist.  Revaluation of earlier CT demonstrates questionable inferior right hepatic lobe mass without definite left lobe mass..  Multiphase contrast CT or MRI imaging needed.    IVC: Patent    Right kidney unremarkable.                                       CT Renal Stone Study ABD Pelvis WO (Edited Result - FINAL)  Result time 09/14/24 10:46:08      Addendum (preliminary) 1 of 1 by Leonardo Sanchez MD (09/14/24 10:46:08)      Additional review demonstrates questionable inferior right hepatic lobe mass.  Correlate with nonemergent multiphase CT and or MRI imaging.      Electronically signed by: Leonardo Sanchez MD  Date:    09/14/2024  Time:    10:46                 Final result by Leonardo Sanchez MD (09/14/24 08:34:38)                   Impression:      Suspected cholelithiasis with cholecystitis.  Correlate clinically with right upper quadrant ultrasound.  No evidence of biliary dilatation.    Other findings as above.      Electronically signed by: Leonardo Sanchez MD  Date:    09/14/2024  Time:    08:34               Narrative:    EXAMINATION:  CT RENAL STONE STUDY ABD PELVIS WO    CLINICAL HISTORY:  Flank pain, kidney stone suspected (pregnant);Pain abdominal unsp. (789.00);    TECHNIQUE:  Low dose axial images, sagittal and coronal reformations were obtained from the lung bases to the pubic symphysis.  Contrast was not administered.    COMPARISON:  07/12/2022    FINDINGS:  Visualized lower chest is unremarkable.    Within the abdomen, the liver and spleen demonstrate no acute abnormality.  Hepatic steatosis noted..  Pancreas is normal. Adrenal glands are unremarkable.    Gallstones suspected near the gallbladder  neck with gallbladder distension.  Fat stranding noted adjacent to the gallbladder.  No biliary dilatation.    Right kidney unremarkable.  Left kidney demonstrates tiny nonobstructive upper pole calculus.  No hydronephrosis.  No distal urolithiasis.    Stomach demonstrates small hiatal hernia..  Small bowel is nonobstructive. Colon is unremarkable. Appendix not identified.    No free fluid or adenopathy present.    Within the pelvis, urinary bladder is unremarkable. No pelvic mass or adenopathy present. No free fluid.    Osseous structures are unremarkable.                                       Medications   0.9%  NaCl infusion (1,000 mLs Intravenous New Bag 9/14/24 1410)   sodium chloride 0.9% flush 10 mL (has no administration in time range)   albuterol-ipratropium 2.5 mg-0.5 mg/3 mL nebulizer solution 3 mL (has no administration in time range)   ondansetron injection 4 mg (has no administration in time range)   prochlorperazine injection Soln 5 mg (has no administration in time range)   naloxone 0.4 mg/mL injection 0.02 mg (has no administration in time range)   glucose chewable tablet 16 g (has no administration in time range)   glucose chewable tablet 24 g (has no administration in time range)   glucagon (human recombinant) injection 1 mg (has no administration in time range)   pantoprazole injection 40 mg (40 mg Intravenous Given 9/15/24 0746)   piperacillin-tazobactam (ZOSYN) 4.5 g in D5W 100 mL IVPB (MB+) (4.5 g Intravenous Bolus 9/15/24 1419)   acetaminophen tablet 650 mg (650 mg Oral Given 9/15/24 0553)   0.9%  NaCl infusion ( Intravenous New Bag 9/15/24 0918)   potassium phosphate 30 mmol in D5W 500 mL infusion (30 mmol Intravenous New Bag 9/15/24 0924)   dextrose 10% bolus 125 mL 125 mL (has no administration in time range)   dextrose 10% bolus 250 mL 250 mL (has no administration in time range)   BUPivacaine 0.25% (2.5 mg/ml) injection (20 mLs Subcutaneous Given 9/15/24 1417)   NON FORMULARY MEDICATION  (10 mLs Subcutaneous Given 9/15/24 1417)   sodium chloride 0.9% bolus 1,000 mL 1,000 mL (0 mLs Intravenous Stopped 9/14/24 0830)   ketorolac injection 15 mg (15 mg Intravenous Given 9/14/24 0729)   ondansetron injection 8 mg (8 mg Intravenous Given 9/14/24 0730)   famotidine (PF) injection 40 mg (40 mg Intravenous Given 9/14/24 0729)   morphine injection 4 mg (4 mg Intravenous Given 9/14/24 1004)   sodium chloride 0.9% bolus 1,000 mL 1,000 mL (0 mLs Intravenous Stopped 9/14/24 1246)   metoclopramide injection 10 mg (10 mg Intravenous Given 9/14/24 1146)   diphenhydrAMINE injection 25 mg (25 mg Intravenous Given 9/14/24 1146)   piperacillin-tazobactam (ZOSYN) 4.5 g in D5W 100 mL IVPB (MB+) (0 g Intravenous Stopped 9/14/24 1322)   sodium chloride 0.9% bolus 500 mL 500 mL (0 mLs Intravenous Stopped 9/14/24 1951)   indocyanine green injection 1.25 mg (1.25 mg Intravenous Given 9/15/24 0930)   magnesium sulfate 2g in water 50mL IVPB (premix) (0 g Intravenous Stopped 9/15/24 1117)     Medical Decision Making  Amount and/or Complexity of Data Reviewed  Labs: ordered.  Radiology: ordered.  ECG/medicine tests:  Decision-making details documented in ED Course.    Risk  Prescription drug management.      Medical Decision Making:    This is an evaluation of a 44 y.o. female that presents to the Emergency Department for   Chief Complaint   Patient presents with    Abdominal Pain     Pt c/o abd pain/bloating starting 2-3 days ago       Independent historian: (parent/ EMS/ spouse/ etc) n/a    The patient is a non-toxic and well appearing patient. On physical exam, patient appears well hydrated with moist mucus membranes. Breath sounds are clear and equal bilaterally with no adventitious breath sounds, tachypnea or respiratory distress. Regular rate and rhythm. No murmurs. Abdomen soft and non tender, obese abdomen, no rigidity, rebound, guarding. Patient is tolerating PO without difficulty. Physical exam otherwise as above.     I  have reviewed vital signs and nursing notes.   Vital Signs Are Reassuring.     Based on the patient's symptoms, I am considering and evaluating for the following differential diagnoses: gastritis, gastroenteritis, pregnancy, UTI, diverticulosis, diverticulitis.       ED Course:Treatment in the ED included Physical Exam and medications given in ED  Medications   0.9%  NaCl infusion (1,000 mLs Intravenous New Bag 9/14/24 1410)   sodium chloride 0.9% flush 10 mL (has no administration in time range)   albuterol-ipratropium 2.5 mg-0.5 mg/3 mL nebulizer solution 3 mL (has no administration in time range)   ondansetron injection 4 mg (has no administration in time range)   prochlorperazine injection Soln 5 mg (has no administration in time range)   naloxone 0.4 mg/mL injection 0.02 mg (has no administration in time range)   glucose chewable tablet 16 g (has no administration in time range)   glucose chewable tablet 24 g (has no administration in time range)   glucagon (human recombinant) injection 1 mg (has no administration in time range)   pantoprazole injection 40 mg (40 mg Intravenous Given 9/15/24 0746)   piperacillin-tazobactam (ZOSYN) 4.5 g in D5W 100 mL IVPB (MB+) (4.5 g Intravenous Bolus 9/15/24 1419)   acetaminophen tablet 650 mg (650 mg Oral Given 9/15/24 0553)   0.9%  NaCl infusion ( Intravenous New Bag 9/15/24 0918)   potassium phosphate 30 mmol in D5W 500 mL infusion (30 mmol Intravenous New Bag 9/15/24 0924)   dextrose 10% bolus 125 mL 125 mL (has no administration in time range)   dextrose 10% bolus 250 mL 250 mL (has no administration in time range)   BUPivacaine 0.25% (2.5 mg/ml) injection (20 mLs Subcutaneous Given 9/15/24 1417)   NON FORMULARY MEDICATION (10 mLs Subcutaneous Given 9/15/24 1417)   sodium chloride 0.9% bolus 1,000 mL 1,000 mL (0 mLs Intravenous Stopped 9/14/24 0830)   ketorolac injection 15 mg (15 mg Intravenous Given 9/14/24 0729)   ondansetron injection 8 mg (8 mg Intravenous Given  9/14/24 0730)   famotidine (PF) injection 40 mg (40 mg Intravenous Given 9/14/24 0729)   morphine injection 4 mg (4 mg Intravenous Given 9/14/24 1004)   sodium chloride 0.9% bolus 1,000 mL 1,000 mL (0 mLs Intravenous Stopped 9/14/24 1246)   metoclopramide injection 10 mg (10 mg Intravenous Given 9/14/24 1146)   diphenhydrAMINE injection 25 mg (25 mg Intravenous Given 9/14/24 1146)   piperacillin-tazobactam (ZOSYN) 4.5 g in D5W 100 mL IVPB (MB+) (0 g Intravenous Stopped 9/14/24 1322)   sodium chloride 0.9% bolus 500 mL 500 mL (0 mLs Intravenous Stopped 9/14/24 1951)   indocyanine green injection 1.25 mg (1.25 mg Intravenous Given 9/15/24 0930)   magnesium sulfate 2g in water 50mL IVPB (premix) (0 g Intravenous Stopped 9/15/24 1117)   .   Patient reports feeling better after treatment in the ER.       External Data/Documents Reviewed: Previous medical records and vital signs reviewed, see HPI and Physical exam.   Labs: ordered and reviewed.  Urinalysis positive for blood.  Radiology: ordered as indicated and reviewed.  CT concerning for acute cholecystitis  ECG/medicine tests: ordered and independent interpretation performed by Dr. Marielena Smith DO. Decision-making details documented in ED Course.   Cardiac monitor placed for abdominal pain. Monitor shows Normal Sinus Rhythm with  rate of 84. Interpreted by Dr. Marielena Smith DO.    Risk  Diagnosis or treatment significantly limited by the following social determinants of health: Body mass index is 54.77 kg/m².     In shared decision making with the patient, we discussed treatment, prescriptions, labs, and imaging results.    Spoke to General Surgery regarding case with results.   Patient with acute cholecystitis with anticipated surgery tomorrow. General surgery Dr. Mendoza, is requesting admit to Hospital Medicine. White blood cell count is elevated at 19.9. Lactic acid is elevated at 2.76. US shows: Cholelithiasis with sonographic findings suggesting cholecystitis.  Liver findings suggestive of intrinsic liver disease/steatosis. Ill-defined hypoechoic liver mass.     I contacted  at time 12:36 PM. Requesting consultation with  for services not available at this facility.   Discussed patient's presentation, past medical history, physical exam, labs, radiology results, vital signs, and ED course.      Patient accepted by STEPHANI Vinson NP on call for Dr Schreiber  for transfer and admission at time 1:00 PM.   At this time patient will be transferred & admitted.  Patient will be transferred via EMS to accepting facility.      At time of transfer patient is awake alert oriented x4 speaking clearly in full sentences and moving all 4 extremities.     The following labs and imaging were reviewed:      Admission on 09/14/2024   Component Date Value Ref Range Status    POC Preg Test, Ur 09/14/2024 Negative  Negative Final     Acceptable 09/14/2024 Yes   Final    Glucose, UA 09/14/2024 Negative  Negative Final    Bilirubin, UA 09/14/2024 Negative  Negative Final    Ketones, UA 09/14/2024 Negative  Negative Final    Spec Grav UA 09/14/2024 1.020  1.005 - 1.030 Final    Blood, UA 09/14/2024 1+ (A)  Negative Final    PH, UA 09/14/2024 5.5  5.0 - 8.0 Final    Protein, UA 09/14/2024 Negative  Negative Final    Urobilinogen, UA 09/14/2024 0.2  <=1.0 E.U./dL Final    Nitrite, UA 09/14/2024 Negative  Negative Final    Leukocytes, UA 09/14/2024 Negative  Negative Final    Color, UA POC 09/14/2024 Yellow  Yellow, Straw, Aleena Final    Clarity, UA, POC 09/14/2024 Clear  Clear Final    QRS Duration 09/14/2024 76  ms Final    OHS QTC Calculation 09/14/2024 430  ms Final    Albumin, POC 09/14/2024 3.8  3.3 - 5.5 g/dL Final    Alkaline Phosphatase, POC 09/14/2024 82  42 - 141 U/L Final    ALT (SGPT), POC 09/14/2024 24  10 - 47 U/L Final    Amylase, POC 09/14/2024 45  14 - 97 U/L Final    AST (SGOT), POC 09/14/2024 28  11 - 38 U/L Final    POC GGT 09/14/2024 41  5 - 65 U/L Final     Bilirubin, POC 09/14/2024 0.8  0.2 - 1.6 mg/dL Final    Protein, POC 09/14/2024 8.1  6.4 - 8.1 g/dL Final    Albumin, POC 09/14/2024 3.7  3.3 - 5.5 g/dL Final    Alkaline Phosphatase, POC 09/14/2024 86  42 - 141 U/L Final    ALT (SGPT), POC 09/14/2024 24  10 - 47 U/L Final    AST (SGOT), POC 09/14/2024 25  11 - 38 U/L Final    POC BUN 09/14/2024 7  7 - 22 mg/dL Final    Calcium, POC 09/14/2024 9.3  8.0 - 10.3 mg/dL Final    POC Chloride 09/14/2024 101  98 - 108 mmol/L Final    POC Creatinine 09/14/2024 0.5 (L)  0.6 - 1.2 mg/dL Final    POC Glucose 09/14/2024 109  73 - 118 mg/dL Final    POC Potassium 09/14/2024 4.0  3.6 - 5.1 mmol/L Final    POC Sodium 09/14/2024 137  128 - 145 mmol/L Final    Bilirubin, POC 09/14/2024 0.8  0.2 - 1.6 mg/dL Final    POC TCO2 09/14/2024 27  18 - 33 mmol/L Final    Protein, POC 09/14/2024 7.9  6.4 - 8.1 g/dL Final    POC Cardiac Troponin I 09/14/2024 0.00  0.00 - 0.08 ng/mL Final    Sample 09/14/2024 unknown   Final    Comment: A single negative troponin is insufficient to rule out myocardial infarction.  The use of a serial sampling protocol is recommended practice. Correlate results with reference intervals established for methodology used. Point of care and core laboratory   troponin results are not interchangeable.      POC PH 09/14/2024 7.320 (L)  7.35 - 7.45 Final    POC PCO2 09/14/2024 48.1 (H)  35 - 45 mmHg Final    POC PO2 09/14/2024 18 (LL)  40 - 60 mmHg Final    POC HCO3 09/14/2024 24.8  24 - 28 mmol/L Final    POC BE 09/14/2024 -2  -2 to 2 mmol/L Final    POC SATURATED O2 09/14/2024 24  95 - 100 % Final    POC Lactate 09/14/2024 2.76 (H)  0.5 - 2.2 mmol/L Final    POC TCO2 09/14/2024 26  24 - 29 mmol/L Final    Sample 09/14/2024 VENOUS   Final    Site 09/14/2024 Other   Final    Allens Test 09/14/2024 N/A   Final    WBC 09/14/2024 23.31 (H)  3.90 - 12.70 K/uL Final    RBC 09/14/2024 4.41  4.00 - 5.40 M/uL Final    Hemoglobin 09/14/2024 13.6  12.0 - 16.0 g/dL Final     Hematocrit 09/14/2024 41.1  37.0 - 48.5 % Final    MCV 09/14/2024 93  82 - 98 fL Final    MCH 09/14/2024 30.8  27.0 - 31.0 pg Final    MCHC 09/14/2024 33.1  32.0 - 36.0 g/dL Final    RDW 09/14/2024 13.0  11.5 - 14.5 % Final    Platelets 09/14/2024 294  150 - 450 K/uL Final    MPV 09/14/2024 9.6  9.2 - 12.9 fL Final    Sodium 09/14/2024 136  136 - 145 mmol/L Final    Potassium 09/14/2024 3.5  3.5 - 5.1 mmol/L Final    Chloride 09/14/2024 104  95 - 110 mmol/L Final    CO2 09/14/2024 19 (L)  23 - 29 mmol/L Final    Glucose 09/14/2024 99  70 - 110 mg/dL Final    BUN 09/14/2024 7  6 - 20 mg/dL Final    Creatinine 09/14/2024 0.8  0.5 - 1.4 mg/dL Final    Calcium 09/14/2024 8.4 (L)  8.7 - 10.5 mg/dL Final    Total Protein 09/14/2024 7.5  6.0 - 8.4 g/dL Final    Albumin 09/14/2024 3.3 (L)  3.5 - 5.2 g/dL Final    Total Bilirubin 09/14/2024 1.3 (H)  0.1 - 1.0 mg/dL Final    Comment: For infants and newborns, interpretation of results should be based  on gestational age, weight and in agreement with clinical  observations.    Premature Infant recommended reference ranges:  Up to 24 hours.............<8.0 mg/dL  Up to 48 hours............<12.0 mg/dL  3-5 days..................<15.0 mg/dL  6-29 days.................<15.0 mg/dL      Alkaline Phosphatase 09/14/2024 83  55 - 135 U/L Final    AST 09/14/2024 21  10 - 40 U/L Final    ALT 09/14/2024 25  10 - 44 U/L Final    eGFR 09/14/2024 >60  >60 mL/min/1.73 m^2 Final    Anion Gap 09/14/2024 13  8 - 16 mmol/L Final    Sodium 09/15/2024 137  136 - 145 mmol/L Final    Potassium 09/15/2024 3.9  3.5 - 5.1 mmol/L Final    Chloride 09/15/2024 106  95 - 110 mmol/L Final    CO2 09/15/2024 21 (L)  23 - 29 mmol/L Final    Glucose 09/15/2024 112 (H)  70 - 110 mg/dL Final    BUN 09/15/2024 5 (L)  6 - 20 mg/dL Final    Creatinine 09/15/2024 0.8  0.5 - 1.4 mg/dL Final    Calcium 09/15/2024 8.4 (L)  8.7 - 10.5 mg/dL Final    Total Protein 09/15/2024 7.2  6.0 - 8.4 g/dL Final    Albumin 09/15/2024  3.0 (L)  3.5 - 5.2 g/dL Final    Total Bilirubin 09/15/2024 1.2 (H)  0.1 - 1.0 mg/dL Final    Comment: For infants and newborns, interpretation of results should be based  on gestational age, weight and in agreement with clinical  observations.    Premature Infant recommended reference ranges:  Up to 24 hours.............<8.0 mg/dL  Up to 48 hours............<12.0 mg/dL  3-5 days..................<15.0 mg/dL  6-29 days.................<15.0 mg/dL      Alkaline Phosphatase 09/15/2024 81  55 - 135 U/L Final    AST 09/15/2024 19  10 - 40 U/L Final    ALT 09/15/2024 22  10 - 44 U/L Final    eGFR 09/15/2024 >60  >60 mL/min/1.73 m^2 Final    Anion Gap 09/15/2024 10  8 - 16 mmol/L Final    WBC 09/15/2024 27.51 (H)  3.90 - 12.70 K/uL Final    RBC 09/15/2024 4.10  4.00 - 5.40 M/uL Final    Hemoglobin 09/15/2024 12.3  12.0 - 16.0 g/dL Final    Hematocrit 09/15/2024 38.9  37.0 - 48.5 % Final    MCV 09/15/2024 95  82 - 98 fL Final    MCH 09/15/2024 30.0  27.0 - 31.0 pg Final    MCHC 09/15/2024 31.6 (L)  32.0 - 36.0 g/dL Final    RDW 09/15/2024 13.2  11.5 - 14.5 % Final    Platelets 09/15/2024 286  150 - 450 K/uL Final    MPV 09/15/2024 10.2  9.2 - 12.9 fL Final    Immature Granulocytes 09/15/2024 1.4 (H)  0.0 - 0.5 % Final    Gran # (ANC) 09/15/2024 23.5 (H)  1.8 - 7.7 K/uL Final    Immature Grans (Abs) 09/15/2024 0.38 (H)  0.00 - 0.04 K/uL Final    Comment: Mild elevation in immature granulocytes is non specific and   can be seen in a variety of conditions including stress response,   acute inflammation, trauma and pregnancy. Correlation with other   laboratory and clinical findings is essential.      Lymph # 09/15/2024 1.1  1.0 - 4.8 K/uL Final    Mono # 09/15/2024 2.5 (H)  0.3 - 1.0 K/uL Final    Eos # 09/15/2024 0.0  0.0 - 0.5 K/uL Final    Baso # 09/15/2024 0.09  0.00 - 0.20 K/uL Final    nRBC 09/15/2024 0  0 /100 WBC Final    Gran % 09/15/2024 85.3 (H)  38.0 - 73.0 % Final    Lymph % 09/15/2024 4.1 (L)  18.0 - 48.0 %  Final    Mono % 09/15/2024 8.9  4.0 - 15.0 % Final    Eosinophil % 09/15/2024 0.0  0.0 - 8.0 % Final    Basophil % 09/15/2024 0.3  0.0 - 1.9 % Final    Differential Method 09/15/2024 Automated   Final    Magnesium 09/15/2024 1.3 (L)  1.6 - 2.6 mg/dL Final    Phosphorus 09/15/2024 1.7 (L)  2.7 - 4.5 mg/dL Final        Imaging Results              US Abdomen Limited (Final result)  Result time 09/14/24 10:44:49      Final result by Leonardo Sanchez MD (09/14/24 10:44:49)                   Impression:      Cholelithiasis with sonographic findings suggesting cholecystitis.    Liver findings suggestive of intrinsic liver disease/steatosis.  Ill-defined hypoechoic liver mass.  Nonemergent multiphase CT and/or MRI imaging recommended.      Electronically signed by: Leonardo Sanchez MD  Date:    09/14/2024  Time:    10:44               Narrative:    EXAMINATION:  US ABDOMEN LIMITED    CLINICAL HISTORY:  Abnormal CT;    TECHNIQUE:  Limited ultrasound of the right upper quadrant of the abdomen performed.    COMPARISON:  None.    FINDINGS:  Pancreas: Unremarkable as visualized    Gallbladder: 2 cm gallstone at the neck.  There is edematous wall thickening and or trace pericholecystic fluid.    Biliary system: The common duct measures 5.7 mm.  No intrahepatic ductal dilatation.    Liver: Demonstrates mildly coarsened increased echotexture.  Irregular hypoechoic 5.9 x 1.7 x 2.6 cm lesion noted within the left lobe per technologist.  Revaluation of earlier CT demonstrates questionable inferior right hepatic lobe mass without definite left lobe mass..  Multiphase contrast CT or MRI imaging needed.    IVC: Patent    Right kidney unremarkable.                                       CT Renal Stone Study ABD Pelvis WO (Edited Result - FINAL)  Result time 09/14/24 10:46:08      Addendum (preliminary) 1 of 1 by Leonardo Sanchez MD (09/14/24 10:46:08)      Additional review demonstrates questionable inferior right hepatic lobe  mass.  Correlate with nonemergent multiphase CT and or MRI imaging.      Electronically signed by: Leonardo Sanchez MD  Date:    09/14/2024  Time:    10:46                 Final result by Leonardo Sanchez MD (09/14/24 08:34:38)                   Impression:      Suspected cholelithiasis with cholecystitis.  Correlate clinically with right upper quadrant ultrasound.  No evidence of biliary dilatation.    Other findings as above.      Electronically signed by: Leonardo Sanchez MD  Date:    09/14/2024  Time:    08:34               Narrative:    EXAMINATION:  CT RENAL STONE STUDY ABD PELVIS WO    CLINICAL HISTORY:  Flank pain, kidney stone suspected (pregnant);Pain abdominal unsp. (789.00);    TECHNIQUE:  Low dose axial images, sagittal and coronal reformations were obtained from the lung bases to the pubic symphysis.  Contrast was not administered.    COMPARISON:  07/12/2022    FINDINGS:  Visualized lower chest is unremarkable.    Within the abdomen, the liver and spleen demonstrate no acute abnormality.  Hepatic steatosis noted..  Pancreas is normal. Adrenal glands are unremarkable.    Gallstones suspected near the gallbladder neck with gallbladder distension.  Fat stranding noted adjacent to the gallbladder.  No biliary dilatation.    Right kidney unremarkable.  Left kidney demonstrates tiny nonobstructive upper pole calculus.  No hydronephrosis.  No distal urolithiasis.    Stomach demonstrates small hiatal hernia..  Small bowel is nonobstructive. Colon is unremarkable. Appendix not identified.    No free fluid or adenopathy present.    Within the pelvis, urinary bladder is unremarkable. No pelvic mass or adenopathy present. No free fluid.    Osseous structures are unremarkable.                                                Scribe Attestation:   Scribe #1: I performed the above scribed service and the documentation accurately describes the services I performed. I attest to the accuracy of the note.        ED  Course as of 09/15/24 1510   Sat Sep 14, 2024   0739 EKG 12-lead  EKG independently interpreted by Dr Smith.   No STEMI  NSR 84 bpm   Rightward axis   Abnromal EKG      When compared to Prev EKG 7/12/24 rate increased by 27 bpm today.  [JL]      ED Course User Index  [JL] Charo Medellin, Dr. Marielena Smith, personally performed the services described in this documentation. This document was produced by a scribe under my direction and in my presence. All medical record entries made by the scribe were at my direction and in my presence.  I have reviewed the chart and agree that the record reflects my personal performance and is accurate and complete. Marielena Smith DO.     09/15/2024 3:10 PM      Clinical Impression:  Final diagnoses:  [R03.0] Elevated blood pressure reading  [K81.0] Acute cholecystitis (Primary)          ED Disposition Condition    Observation Stable                Marielena Smith DO  09/15/24 1510

## 2024-09-14 NOTE — PLAN OF CARE
Surgery note    Case posted for tomorrow A.M for robotic cholecystectomy  Recommend IVF for now. would likely benefit from some resuscitation (bolus ordered).    Ok for clear liquid diet.  Npo midnight.

## 2024-09-14 NOTE — ASSESSMENT & PLAN NOTE
Consult general surgery  NPO   IVF   Start on prophylaxis Zosyn  Pain control   Antiemetics as needed

## 2024-09-14 NOTE — HPI
"44 year old female with past medical history of Graves disease presented to the ED at [facility] with c/o abdominal pain since last night. Patient describes her pain as "tightness" in character across her entire abdomen. She reports NVD last week which resolved. However, she started experiencing nausea again last night and was unable to consume any food as a result. She had a soft BM yesterday after taking a laxative. No other exacerbating or alleviating factors. Denies chest pain, or other associated symptoms. In the ED, WBC 19.9 Lactic acid is elevated at 2.76. US shows: Cholelithiasis with sonographic findings suggesting cholecystitis. Liver findings suggestive of intrinsic liver disease/steatosis. Ill-defined hypoechoic liver mass.  EKG showed NSR 84 bpm. Patient transferred to [facility] for evaluation by General surgery.   "

## 2024-09-14 NOTE — ASSESSMENT & PLAN NOTE
History of hypertension  Not on any medication   Will monitor for now  Likely elevated due to pain

## 2024-09-15 ENCOUNTER — ANESTHESIA (OUTPATIENT)
Dept: SURGERY | Facility: HOSPITAL | Age: 44
DRG: 854 | End: 2024-09-15
Payer: COMMERCIAL

## 2024-09-15 PROBLEM — E83.42 HYPOMAGNESEMIA: Status: ACTIVE | Noted: 2024-09-15

## 2024-09-15 PROBLEM — A41.9 SEPSIS: Status: ACTIVE | Noted: 2024-09-15

## 2024-09-15 PROBLEM — R93.2 ABNORMAL CT OF LIVER: Status: ACTIVE | Noted: 2024-09-15

## 2024-09-15 PROBLEM — E83.39 HYPOPHOSPHATEMIA: Status: ACTIVE | Noted: 2024-09-15

## 2024-09-15 PROBLEM — R93.5 ABNORMAL US (ULTRASOUND) OF ABDOMEN: Status: ACTIVE | Noted: 2024-09-15

## 2024-09-15 LAB
ALBUMIN SERPL BCP-MCNC: 3 G/DL (ref 3.5–5.2)
ALP SERPL-CCNC: 81 U/L (ref 55–135)
ALT SERPL W/O P-5'-P-CCNC: 22 U/L (ref 10–44)
ANION GAP SERPL CALC-SCNC: 10 MMOL/L (ref 8–16)
AST SERPL-CCNC: 19 U/L (ref 10–40)
BASOPHILS # BLD AUTO: 0.09 K/UL (ref 0–0.2)
BASOPHILS NFR BLD: 0.3 % (ref 0–1.9)
BILIRUB SERPL-MCNC: 1.2 MG/DL (ref 0.1–1)
BUN SERPL-MCNC: 5 MG/DL (ref 6–20)
CALCIUM SERPL-MCNC: 8.4 MG/DL (ref 8.7–10.5)
CHLORIDE SERPL-SCNC: 106 MMOL/L (ref 95–110)
CO2 SERPL-SCNC: 21 MMOL/L (ref 23–29)
CREAT SERPL-MCNC: 0.8 MG/DL (ref 0.5–1.4)
DIFFERENTIAL METHOD BLD: ABNORMAL
EOSINOPHIL # BLD AUTO: 0 K/UL (ref 0–0.5)
EOSINOPHIL NFR BLD: 0 % (ref 0–8)
ERYTHROCYTE [DISTWIDTH] IN BLOOD BY AUTOMATED COUNT: 13.2 % (ref 11.5–14.5)
EST. GFR  (NO RACE VARIABLE): >60 ML/MIN/1.73 M^2
GLUCOSE SERPL-MCNC: 112 MG/DL (ref 70–110)
HCT VFR BLD AUTO: 38.9 % (ref 37–48.5)
HGB BLD-MCNC: 12.3 G/DL (ref 12–16)
IMM GRANULOCYTES # BLD AUTO: 0.38 K/UL (ref 0–0.04)
IMM GRANULOCYTES NFR BLD AUTO: 1.4 % (ref 0–0.5)
LYMPHOCYTES # BLD AUTO: 1.1 K/UL (ref 1–4.8)
LYMPHOCYTES NFR BLD: 4.1 % (ref 18–48)
MAGNESIUM SERPL-MCNC: 1.3 MG/DL (ref 1.6–2.6)
MAGNESIUM SERPL-MCNC: 1.8 MG/DL (ref 1.6–2.6)
MCH RBC QN AUTO: 30 PG (ref 27–31)
MCHC RBC AUTO-ENTMCNC: 31.6 G/DL (ref 32–36)
MCV RBC AUTO: 95 FL (ref 82–98)
MONOCYTES # BLD AUTO: 2.5 K/UL (ref 0.3–1)
MONOCYTES NFR BLD: 8.9 % (ref 4–15)
NEUTROPHILS # BLD AUTO: 23.5 K/UL (ref 1.8–7.7)
NEUTROPHILS NFR BLD: 85.3 % (ref 38–73)
NRBC BLD-RTO: 0 /100 WBC
PHOSPHATE SERPL-MCNC: 1.7 MG/DL (ref 2.7–4.5)
PHOSPHATE SERPL-MCNC: 2.4 MG/DL (ref 2.7–4.5)
PLATELET # BLD AUTO: 286 K/UL (ref 150–450)
PMV BLD AUTO: 10.2 FL (ref 9.2–12.9)
POCT GLUCOSE: 161 MG/DL (ref 70–110)
POTASSIUM SERPL-SCNC: 3.9 MMOL/L (ref 3.5–5.1)
PROT SERPL-MCNC: 7.2 G/DL (ref 6–8.4)
RBC # BLD AUTO: 4.1 M/UL (ref 4–5.4)
SODIUM SERPL-SCNC: 137 MMOL/L (ref 136–145)
WBC # BLD AUTO: 27.51 K/UL (ref 3.9–12.7)

## 2024-09-15 PROCEDURE — 85025 COMPLETE CBC W/AUTO DIFF WBC: CPT | Performed by: HOSPITALIST

## 2024-09-15 PROCEDURE — 36000711: Performed by: SURGERY

## 2024-09-15 PROCEDURE — C9290 INJ, BUPIVACAINE LIPOSOME: HCPCS

## 2024-09-15 PROCEDURE — 63600175 PHARM REV CODE 636 W HCPCS: Performed by: NURSE ANESTHETIST, CERTIFIED REGISTERED

## 2024-09-15 PROCEDURE — 25000003 PHARM REV CODE 250: Performed by: NURSE ANESTHETIST, CERTIFIED REGISTERED

## 2024-09-15 PROCEDURE — 80053 COMPREHEN METABOLIC PANEL: CPT | Performed by: HOSPITALIST

## 2024-09-15 PROCEDURE — 21400001 HC TELEMETRY ROOM

## 2024-09-15 PROCEDURE — 25000003 PHARM REV CODE 250: Performed by: STUDENT IN AN ORGANIZED HEALTH CARE EDUCATION/TRAINING PROGRAM

## 2024-09-15 PROCEDURE — 37000009 HC ANESTHESIA EA ADD 15 MINS: Performed by: SURGERY

## 2024-09-15 PROCEDURE — 63600175 PHARM REV CODE 636 W HCPCS: Performed by: NURSE PRACTITIONER

## 2024-09-15 PROCEDURE — 87040 BLOOD CULTURE FOR BACTERIA: CPT | Mod: 59 | Performed by: STUDENT IN AN ORGANIZED HEALTH CARE EDUCATION/TRAINING PROGRAM

## 2024-09-15 PROCEDURE — 63600175 PHARM REV CODE 636 W HCPCS

## 2024-09-15 PROCEDURE — 36415 COLL VENOUS BLD VENIPUNCTURE: CPT | Mod: XB | Performed by: NURSE PRACTITIONER

## 2024-09-15 PROCEDURE — 88304 TISSUE EXAM BY PATHOLOGIST: CPT | Performed by: PATHOLOGY

## 2024-09-15 PROCEDURE — 99900035 HC TECH TIME PER 15 MIN (STAT)

## 2024-09-15 PROCEDURE — 63600175 PHARM REV CODE 636 W HCPCS: Mod: JG | Performed by: SURGERY

## 2024-09-15 PROCEDURE — 8E0W4CZ ROBOTIC ASSISTED PROCEDURE OF TRUNK REGION, PERCUTANEOUS ENDOSCOPIC APPROACH: ICD-10-PCS | Performed by: SURGERY

## 2024-09-15 PROCEDURE — 25000003 PHARM REV CODE 250: Performed by: NURSE PRACTITIONER

## 2024-09-15 PROCEDURE — 36415 COLL VENOUS BLD VENIPUNCTURE: CPT | Performed by: STUDENT IN AN ORGANIZED HEALTH CARE EDUCATION/TRAINING PROGRAM

## 2024-09-15 PROCEDURE — 27201423 OPTIME MED/SURG SUP & DEVICES STERILE SUPPLY: Performed by: SURGERY

## 2024-09-15 PROCEDURE — 36000710: Performed by: SURGERY

## 2024-09-15 PROCEDURE — 96366 THER/PROPH/DIAG IV INF ADDON: CPT

## 2024-09-15 PROCEDURE — 83735 ASSAY OF MAGNESIUM: CPT | Mod: 91 | Performed by: NURSE PRACTITIONER

## 2024-09-15 PROCEDURE — 88304 TISSUE EXAM BY PATHOLOGIST: CPT | Mod: 26,,, | Performed by: PATHOLOGY

## 2024-09-15 PROCEDURE — 96375 TX/PRO/DX INJ NEW DRUG ADDON: CPT

## 2024-09-15 PROCEDURE — 94761 N-INVAS EAR/PLS OXIMETRY MLT: CPT

## 2024-09-15 PROCEDURE — 37000008 HC ANESTHESIA 1ST 15 MINUTES: Performed by: SURGERY

## 2024-09-15 PROCEDURE — 0FT44ZZ RESECTION OF GALLBLADDER, PERCUTANEOUS ENDOSCOPIC APPROACH: ICD-10-PCS | Performed by: SURGERY

## 2024-09-15 PROCEDURE — 71000033 HC RECOVERY, INTIAL HOUR: Performed by: SURGERY

## 2024-09-15 PROCEDURE — 36415 COLL VENOUS BLD VENIPUNCTURE: CPT | Performed by: HOSPITALIST

## 2024-09-15 PROCEDURE — 99223 1ST HOSP IP/OBS HIGH 75: CPT | Mod: 57,,, | Performed by: SURGERY

## 2024-09-15 PROCEDURE — 47562 LAPAROSCOPIC CHOLECYSTECTOMY: CPT | Mod: 22,,, | Performed by: SURGERY

## 2024-09-15 PROCEDURE — 84100 ASSAY OF PHOSPHORUS: CPT | Performed by: NURSE PRACTITIONER

## 2024-09-15 PROCEDURE — 25000003 PHARM REV CODE 250: Performed by: SURGERY

## 2024-09-15 RX ORDER — HYDROMORPHONE HYDROCHLORIDE 2 MG/ML
0.2 INJECTION, SOLUTION INTRAMUSCULAR; INTRAVENOUS; SUBCUTANEOUS EVERY 5 MIN PRN
Status: DISCONTINUED | OUTPATIENT
Start: 2024-09-15 | End: 2024-09-15

## 2024-09-15 RX ORDER — LIDOCAINE HYDROCHLORIDE 20 MG/ML
INJECTION INTRAVENOUS
Status: DISCONTINUED | OUTPATIENT
Start: 2024-09-15 | End: 2024-09-15

## 2024-09-15 RX ORDER — SODIUM CHLORIDE 0.9 % (FLUSH) 0.9 %
10 SYRINGE (ML) INJECTION
Status: DISCONTINUED | OUTPATIENT
Start: 2024-09-15 | End: 2024-09-16 | Stop reason: HOSPADM

## 2024-09-15 RX ORDER — PHENYLEPHRINE HYDROCHLORIDE 10 MG/ML
INJECTION INTRAVENOUS
Status: DISCONTINUED | OUTPATIENT
Start: 2024-09-15 | End: 2024-09-15

## 2024-09-15 RX ORDER — ONDANSETRON HYDROCHLORIDE 2 MG/ML
INJECTION, SOLUTION INTRAVENOUS
Status: DISCONTINUED | OUTPATIENT
Start: 2024-09-15 | End: 2024-09-15

## 2024-09-15 RX ORDER — ACETAMINOPHEN 325 MG/1
650 TABLET ORAL EVERY 6 HOURS PRN
Status: DISCONTINUED | OUTPATIENT
Start: 2024-09-15 | End: 2024-09-16 | Stop reason: HOSPADM

## 2024-09-15 RX ORDER — INDOCYANINE GREEN AND WATER 25 MG
1.25 KIT INJECTION ONCE
Status: COMPLETED | OUTPATIENT
Start: 2024-09-15 | End: 2024-09-15

## 2024-09-15 RX ORDER — OXYCODONE AND ACETAMINOPHEN 5; 325 MG/1; MG/1
1 TABLET ORAL EVERY 4 HOURS PRN
Status: DISCONTINUED | OUTPATIENT
Start: 2024-09-15 | End: 2024-09-16 | Stop reason: HOSPADM

## 2024-09-15 RX ORDER — ROCURONIUM BROMIDE 10 MG/ML
INJECTION, SOLUTION INTRAVENOUS
Status: DISCONTINUED | OUTPATIENT
Start: 2024-09-15 | End: 2024-09-15

## 2024-09-15 RX ORDER — BUPIVACAINE HYDROCHLORIDE 2.5 MG/ML
INJECTION, SOLUTION INFILTRATION; PERINEURAL
Status: DISCONTINUED | OUTPATIENT
Start: 2024-09-15 | End: 2024-09-15 | Stop reason: HOSPADM

## 2024-09-15 RX ORDER — SODIUM CHLORIDE 9 MG/ML
INJECTION, SOLUTION INTRAVENOUS CONTINUOUS
Status: ACTIVE | OUTPATIENT
Start: 2024-09-15 | End: 2024-09-16

## 2024-09-15 RX ORDER — ACETAMINOPHEN 10 MG/ML
INJECTION, SOLUTION INTRAVENOUS
Status: DISCONTINUED | OUTPATIENT
Start: 2024-09-15 | End: 2024-09-15

## 2024-09-15 RX ORDER — MAGNESIUM SULFATE HEPTAHYDRATE 40 MG/ML
2 INJECTION, SOLUTION INTRAVENOUS ONCE
Status: COMPLETED | OUTPATIENT
Start: 2024-09-15 | End: 2024-09-15

## 2024-09-15 RX ORDER — GLUCAGON 1 MG
1 KIT INJECTION
Status: DISCONTINUED | OUTPATIENT
Start: 2024-09-15 | End: 2024-09-16 | Stop reason: HOSPADM

## 2024-09-15 RX ORDER — DEXAMETHASONE SODIUM PHOSPHATE 4 MG/ML
INJECTION, SOLUTION INTRA-ARTICULAR; INTRALESIONAL; INTRAMUSCULAR; INTRAVENOUS; SOFT TISSUE
Status: DISCONTINUED | OUTPATIENT
Start: 2024-09-15 | End: 2024-09-15

## 2024-09-15 RX ORDER — FENTANYL CITRATE 50 UG/ML
INJECTION, SOLUTION INTRAMUSCULAR; INTRAVENOUS
Status: DISCONTINUED | OUTPATIENT
Start: 2024-09-15 | End: 2024-09-15

## 2024-09-15 RX ORDER — MIDAZOLAM HYDROCHLORIDE 1 MG/ML
INJECTION INTRAMUSCULAR; INTRAVENOUS
Status: DISCONTINUED | OUTPATIENT
Start: 2024-09-15 | End: 2024-09-15

## 2024-09-15 RX ORDER — PROCHLORPERAZINE EDISYLATE 5 MG/ML
INJECTION INTRAMUSCULAR; INTRAVENOUS
Status: DISCONTINUED | OUTPATIENT
Start: 2024-09-15 | End: 2024-09-15

## 2024-09-15 RX ORDER — PROPOFOL 10 MG/ML
VIAL (ML) INTRAVENOUS
Status: DISCONTINUED | OUTPATIENT
Start: 2024-09-15 | End: 2024-09-15

## 2024-09-15 RX ADMIN — LIDOCAINE HYDROCHLORIDE 100 MG: 20 INJECTION, SOLUTION INTRAVENOUS at 01:09

## 2024-09-15 RX ADMIN — PANTOPRAZOLE SODIUM 40 MG: 40 INJECTION, POWDER, LYOPHILIZED, FOR SOLUTION INTRAVENOUS at 07:09

## 2024-09-15 RX ADMIN — PHENYLEPHRINE HYDROCHLORIDE 200 MCG: 10 INJECTION INTRAVENOUS at 02:09

## 2024-09-15 RX ADMIN — SODIUM CHLORIDE, SODIUM LACTATE, POTASSIUM CHLORIDE, AND CALCIUM CHLORIDE: .6; .31; .03; .02 INJECTION, SOLUTION INTRAVENOUS at 03:09

## 2024-09-15 RX ADMIN — PROCHLORPERAZINE EDISYLATE 5 MG: 5 INJECTION INTRAMUSCULAR; INTRAVENOUS at 02:09

## 2024-09-15 RX ADMIN — GLYCOPYRROLATE 0.1 MG: 0.2 INJECTION, SOLUTION INTRAMUSCULAR; INTRAVITREAL at 01:09

## 2024-09-15 RX ADMIN — ONDANSETRON 4 MG: 2 INJECTION, SOLUTION INTRAMUSCULAR; INTRAVENOUS at 02:09

## 2024-09-15 RX ADMIN — SODIUM CHLORIDE: 9 INJECTION, SOLUTION INTRAVENOUS at 09:09

## 2024-09-15 RX ADMIN — MIDAZOLAM HYDROCHLORIDE 2 MG: 1 INJECTION INTRAMUSCULAR; INTRAVENOUS at 01:09

## 2024-09-15 RX ADMIN — ACETAMINOPHEN 650 MG: 325 TABLET ORAL at 05:09

## 2024-09-15 RX ADMIN — POTASSIUM PHOSPHATE, MONOBASIC AND POTASSIUM PHOSPHATE, DIBASIC 30 MMOL: 224; 236 INJECTION, SOLUTION, CONCENTRATE INTRAVENOUS at 09:09

## 2024-09-15 RX ADMIN — ROCURONIUM BROMIDE 30 MG: 10 INJECTION, SOLUTION INTRAVENOUS at 02:09

## 2024-09-15 RX ADMIN — MAGNESIUM SULFATE HEPTAHYDRATE 2 G: 40 INJECTION, SOLUTION INTRAVENOUS at 09:09

## 2024-09-15 RX ADMIN — OXYCODONE HYDROCHLORIDE AND ACETAMINOPHEN 1 TABLET: 5; 325 TABLET ORAL at 06:09

## 2024-09-15 RX ADMIN — ACETAMINOPHEN 1000 MG: 10 INJECTION, SOLUTION INTRAVENOUS at 03:09

## 2024-09-15 RX ADMIN — FENTANYL CITRATE 50 MCG: 0.05 INJECTION, SOLUTION INTRAMUSCULAR; INTRAVENOUS at 03:09

## 2024-09-15 RX ADMIN — PHENYLEPHRINE HYDROCHLORIDE 100 MCG: 10 INJECTION INTRAVENOUS at 04:09

## 2024-09-15 RX ADMIN — PROPOFOL 200 MG: 10 INJECTION, EMULSION INTRAVENOUS at 01:09

## 2024-09-15 RX ADMIN — SUGAMMADEX 200 MG: 100 INJECTION, SOLUTION INTRAVENOUS at 04:09

## 2024-09-15 RX ADMIN — FENTANYL CITRATE 50 MCG: 0.05 INJECTION, SOLUTION INTRAMUSCULAR; INTRAVENOUS at 01:09

## 2024-09-15 RX ADMIN — DEXAMETHASONE SODIUM PHOSPHATE 4 MG: 4 INJECTION, SOLUTION INTRAMUSCULAR; INTRAVENOUS at 02:09

## 2024-09-15 RX ADMIN — PIPERACILLIN AND TAZOBACTAM 4.5 G: 4; .5 INJECTION, POWDER, LYOPHILIZED, FOR SOLUTION INTRAVENOUS; PARENTERAL at 02:09

## 2024-09-15 RX ADMIN — INDOCYANINE GREEN AND WATER 1.25 MG: KIT at 09:09

## 2024-09-15 RX ADMIN — ROCURONIUM BROMIDE 60 MG: 10 INJECTION, SOLUTION INTRAVENOUS at 01:09

## 2024-09-15 RX ADMIN — FENTANYL CITRATE 25 MCG: 0.05 INJECTION, SOLUTION INTRAMUSCULAR; INTRAVENOUS at 04:09

## 2024-09-15 RX ADMIN — PHENYLEPHRINE HYDROCHLORIDE 100 MCG: 10 INJECTION INTRAVENOUS at 02:09

## 2024-09-15 RX ADMIN — PHENYLEPHRINE HYDROCHLORIDE 100 MCG: 10 INJECTION INTRAVENOUS at 01:09

## 2024-09-15 RX ADMIN — PIPERACILLIN AND TAZOBACTAM 4.5 G: 4; .5 INJECTION, POWDER, LYOPHILIZED, FOR SOLUTION INTRAVENOUS; PARENTERAL at 04:09

## 2024-09-15 RX ADMIN — ROCURONIUM BROMIDE 10 MG: 10 INJECTION, SOLUTION INTRAVENOUS at 03:09

## 2024-09-15 RX ADMIN — SODIUM CHLORIDE, SODIUM LACTATE, POTASSIUM CHLORIDE, AND CALCIUM CHLORIDE: .6; .31; .03; .02 INJECTION, SOLUTION INTRAVENOUS at 01:09

## 2024-09-15 RX ADMIN — PHENYLEPHRINE HYDROCHLORIDE 200 MCG: 10 INJECTION INTRAVENOUS at 04:09

## 2024-09-15 RX ADMIN — FENTANYL CITRATE 50 MCG: 0.05 INJECTION, SOLUTION INTRAMUSCULAR; INTRAVENOUS at 02:09

## 2024-09-15 NOTE — ASSESSMENT & PLAN NOTE
US of abdomen showed Ill-defined hypoechoic liver mass   Will refer to hepatology on outpatient  Will order CT or MRI outpatient

## 2024-09-15 NOTE — PLAN OF CARE
Problem: Adult Inpatient Plan of Care  Goal: Plan of Care Review  Outcome: Progressing  Flowsheets (Taken 9/15/2024 0445)  Plan of Care Reviewed With: patient  Goal: Optimal Comfort and Wellbeing  Outcome: Progressing  Intervention: Monitor Pain and Promote Comfort  Flowsheets (Taken 9/15/2024 0445)  Pain Management Interventions:   pillow support provided   quiet environment facilitated   position adjusted     Problem: Bariatric Environmental Safety  Goal: Safety Maintained with Care  Outcome: Progressing     Problem: Infection  Goal: Absence of Infection Signs and Symptoms  Outcome: Progressing

## 2024-09-15 NOTE — TRANSFER OF CARE
"Anesthesia Transfer of Care Note    Patient: Malika Johnson    Procedure(s) Performed: Procedure(s) (LRB):  ROBOTIC CHOLECYSTECTOMY (N/A)    Patient location: PACU    Anesthesia Type: general    Transport from OR: Transported from OR on 100% O2 by closed face mask with adequate spontaneous ventilation    Post pain: adequate analgesia    Post assessment: no apparent anesthetic complications and tolerated procedure well    Post vital signs: stable    Level of consciousness: awake    Nausea/Vomiting: no nausea/vomiting    Complications: none    Transfer of care protocol was followed    Last vitals: Visit Vitals  BP (!) 102/55 (BP Location: Left forearm)   Pulse 104   Temp 36.8 °C (98.3 °F) (Axillary)   Resp 17   Ht 4' 11" (1.499 m)   Wt 123 kg (271 lb 2.7 oz)   LMP  (LMP Unknown)   SpO2 100%   Breastfeeding No   BMI 54.77 kg/m²     "

## 2024-09-15 NOTE — NURSING
[facility]  Nurses Note -- 4 Eyes      9/15/2024       Skin assessed on: Q Shift      [] No Pressure Injuries Present    []Prevention Measures Documented    [] Yes LDA  for Pressure Injury Previously documented     [] Yes New Pressure Injury Discovered   [] LDA for New Pressure Injury Added      Attending RN:  Manuela Gray RN     Second RN:  Milana

## 2024-09-15 NOTE — ANESTHESIA PROCEDURE NOTES
Intubation    Date/Time: 9/15/2024 1:47 PM    Performed by: José Raya CRNA  Authorized by: Isaiah Bahena MD    Intubation:     Induction:  Intravenous    Intubated:  Postinduction    Mask Ventilation:  Moderately difficult with oral airway    Attempts:  1    Attempted By:  CRNA    Method of Intubation:  Video laryngoscopy    Blade:  Garcia 3    Laryngeal View Grade: Grade IIb - only the arytenoids and epiglottis seen      Difficult Airway Encountered?: No      Complications:  None    Airway Device:  Oral endotracheal tube    Airway Device Size:  7.0    Style/Cuff Inflation:  Cuffed (inflated to minimal occlusive pressure)    Inflation Amount (mL):  5    Tube secured:  21    Secured at:  The lips    Placement Verified By:  Capnometry    Complicating Factors:  Anterior larynx, small mouth, obesity and oropharyngeal edema or fat    Findings Post-Intubation:  BS equal bilateral and atraumatic/condition of teeth unchanged

## 2024-09-15 NOTE — ASSESSMENT & PLAN NOTE
Consult general surgery  NPO   IVF   Start on prophylaxis Zosyn  Pain control   Antiemetics as needed   Surgery today

## 2024-09-15 NOTE — SUBJECTIVE & OBJECTIVE
Interval History: patient seen and examined. She looks ill-appearance this morning but denies abdominal pain. Surgery today. Replace electrolytes.     Review of Systems   Gastrointestinal:  Positive for abdominal pain, diarrhea, nausea and vomiting.   All other systems reviewed and are negative.    Objective:     Vital Signs (Most Recent):  Temp: 98.7 °F (37.1 °C) (09/15/24 0809)  Pulse: 101 (09/15/24 0809)  Resp: 20 (09/15/24 0809)  BP: 115/80 (09/15/24 0809)  SpO2: 96 % (09/15/24 0809) Vital Signs (24h Range):  Temp:  [98.4 °F (36.9 °C)-102 °F (38.9 °C)] 98.7 °F (37.1 °C)  Pulse:  [] 101  Resp:  [] 20  SpO2:  [92 %-98 %] 96 %  BP: (113-166)/(67-95) 115/80     Weight: 123 kg (271 lb 2.7 oz)  Body mass index is 54.77 kg/m².    Intake/Output Summary (Last 24 hours) at 9/15/2024 0955  Last data filed at 9/15/2024 0901  Gross per 24 hour   Intake 101.83 ml   Output --   Net 101.83 ml         Physical Exam  Constitutional:       General: She is not in acute distress.     Appearance: She is obese. She is ill-appearing.   HENT:      Head: Normocephalic and atraumatic.      Mouth/Throat:      Mouth: Mucous membranes are dry.   Eyes:      Extraocular Movements: Extraocular movements intact.   Cardiovascular:      Rate and Rhythm: Normal rate and regular rhythm.   Abdominal:      Palpations: Abdomen is soft.   Musculoskeletal:         General: Normal range of motion.      Cervical back: Normal range of motion.   Skin:     General: Skin is warm and dry.   Neurological:      Mental Status: She is alert and oriented to person, place, and time. Mental status is at baseline.   Psychiatric:         Mood and Affect: Mood normal.         Behavior: Behavior normal.         Thought Content: Thought content normal.         Judgment: Judgment normal.             Significant Labs: All pertinent labs within the past 24 hours have been reviewed.    Significant Imaging: I have reviewed all pertinent imaging results/findings  within the past 24 hours.

## 2024-09-15 NOTE — ASSESSMENT & PLAN NOTE
Patient has Abnormal Phosphorus: hypophosphatemia. Will continue to monitor electrolytes closely. Will replace the affected electrolytes and repeat labs to be done after interventions completed. The patient's phosphorus results have been reviewed and are listed below.  Recent Labs   Lab 09/15/24  0535   PHOS 1.7*

## 2024-09-15 NOTE — CONSULTS
[facility] Surg  General Surgery  Consult Note    Inpatient consult to General Surgery  Consult performed by: Haider Mendoza MD  Consult ordered by: Marielena Smith DO        Subjective:     Chief Complaint/Reason for Admission: cholecystitis    History of Present Illness:   44 yr old morbidly obese WF w 4 day hx of abdominal pain. Workup reveals cholecystitis. She has never had this pain before.  +febrile.  Only surgical history is appy and c sections  Interested in surgery  No cardiac or pulmomary disease     No current facility-administered medications on file prior to encounter.     Current Outpatient Medications on File Prior to Encounter   Medication Sig    azithromycin (Z-JUAN) 250 MG tablet Take 1 tablet (250 mg total) by mouth once daily. Take first 2 tablets together, then 1 every day until finished.    betamethasone valerate 0.1% (VALISONE) 0.1 % Crea Apply topically 2 (two) times daily.    CALCIUM ORAL Take by mouth.    dextroamphetamine-amphetamine (ADDERALL XR) 20 MG 24 hr capsule Take 20 mg by mouth every morning.    ferrous sulfate (IRON ORAL) Take by mouth.    fluticasone propionate (FLONASE) 50 mcg/actuation nasal spray 2 sprays (100 mcg total) by Each Nostril route once daily.    loratadine (CLARITIN) 10 mg tablet Take 1 tablet (10 mg total) by mouth every morning.    multivit-min-ferrous sulfate 15 mg iron Tab Take by mouth.    MULTIVITAMIN ORAL Take by mouth.    norethindrone (MICRONOR) 0.35 mg tablet Take 1 tablet by mouth once daily.    omeprazole (PRILOSEC) 20 MG capsule Take 1 capsule (20 mg total) by mouth once daily.       Review of patient's allergies indicates:   Allergen Reactions    Amoxicillin Hives       Past Medical History:   Diagnosis Date    Graves disease     Mastitis     left breast     Past Surgical History:   Procedure Laterality Date    APPENDECTOMY      BREAST SURGERY  2003    left breast     SECTION      x1    THYROIDECTOMY       Family History       Problem  Relation (Age of Onset)    Cancer Paternal Aunt    Liver cancer Paternal Grandmother    Lung cancer Paternal Uncle          Tobacco Use    Smoking status: Never    Smokeless tobacco: Never   Substance and Sexual Activity    Alcohol use: Yes    Drug use: No    Sexual activity: Not Currently     Partners: Male     Review of Systems   Constitutional:  Positive for fever. Negative for chills.   HENT: Negative.     Eyes: Negative.    Respiratory:  Negative for cough, chest tightness and shortness of breath.    Cardiovascular: Negative.    Gastrointestinal:  Positive for abdominal pain. Negative for blood in stool, constipation, diarrhea, nausea and vomiting.   Endocrine: Negative for cold intolerance and heat intolerance.   Genitourinary: Negative.    Musculoskeletal: Negative.    Skin: Negative.  Negative for rash.   Neurological:  Negative for dizziness, syncope and light-headedness.   Psychiatric/Behavioral:  Negative for agitation, confusion and hallucinations.      Objective:     Vital Signs (Most Recent):  Temp: 98.7 °F (37.1 °C) (09/15/24 0809)  Pulse: (!) 111 (09/15/24 1120)  Resp: 20 (09/15/24 0809)  BP: 115/80 (09/15/24 0809)  SpO2: 96 % (09/15/24 0809) Vital Signs (24h Range):  Temp:  [98.4 °F (36.9 °C)-102 °F (38.9 °C)] 98.7 °F (37.1 °C)  Pulse:  [100-121] 111  Resp:  [] 20  SpO2:  [92 %-98 %] 96 %  BP: (113-166)/(67-95) 115/80     Weight: 123 kg (271 lb 2.7 oz)  Body mass index is 54.77 kg/m².      Intake/Output Summary (Last 24 hours) at 9/15/2024 1315  Last data filed at 9/15/2024 1134  Gross per 24 hour   Intake 146.98 ml   Output 1 ml   Net 145.98 ml       Physical Exam  Constitutional:       General: She is not in acute distress.     Appearance: She is well-developed. She is obese. She is not diaphoretic.   HENT:      Head: Normocephalic and atraumatic.   Eyes:      Conjunctiva/sclera: Conjunctivae normal.      Pupils: Pupils are equal, round, and reactive to light.   Cardiovascular:      Rate and  Rhythm: Normal rate and regular rhythm.      Pulses: Normal pulses.      Heart sounds: Normal heart sounds.   Pulmonary:      Effort: Pulmonary effort is normal. No respiratory distress.      Breath sounds: Normal breath sounds. No stridor. No wheezing.   Abdominal:      General: Bowel sounds are normal. There is no distension.      Palpations: Abdomen is soft.      Tenderness: There is abdominal tenderness.   Musculoskeletal:         General: Normal range of motion.      Cervical back: Normal range of motion and neck supple.   Skin:     General: Skin is warm and dry.      Findings: No rash.   Neurological:      Mental Status: She is alert and oriented to person, place, and time.      Cranial Nerves: No cranial nerve deficit.   Psychiatric:         Behavior: Behavior normal.         Significant Labs:  CBC:   Recent Labs   Lab 09/15/24  0535   WBC 27.51*   RBC 4.10   HGB 12.3   HCT 38.9      MCV 95   MCH 30.0   MCHC 31.6*     CMP:   Recent Labs   Lab 09/15/24  0535   *   CALCIUM 8.4*   ALBUMIN 3.0*   PROT 7.2      K 3.9   CO2 21*      BUN 5*   CREATININE 0.8   ALKPHOS 81   ALT 22   AST 19   BILITOT 1.2*       Significant Diagnostics:  CT: I have reviewed all pertinent results/findings within the past 24 hours. cholecystitis  U/S: I have reviewed all pertinent results/findings within the past 24 hours. cholecystitis    Assessment/Plan:     Active Diagnoses:    Diagnosis Date Noted POA    PRINCIPAL PROBLEM:  Acute cholecystitis [K81.0] 09/14/2024 Unknown    Abnormal CT of liver [R93.2] 09/15/2024 Unknown    Sepsis [A41.9] 09/15/2024 Unknown    Hypophosphatemia [E83.39] 09/15/2024 Unknown    Hypomagnesemia [E83.42] 09/15/2024 Unknown    Leukocytosis [D72.829] 09/14/2024 Unknown    Attention deficit disorder (ADD) in adult [F98.8] 03/28/2023 Yes    Obesity [E66.9] 05/10/2022 Yes    Primary hypertension [I10] 05/10/2022 Yes    Graves disease [E05.00]  Yes      Problems Resolved During this  Admission:     To OR today for robotic cholecystectomy  Consent signed  Risks and side effects discussed with the patient. Alterative therapies discussed. Patient agreeable to procedure and has signed consent which was discussed in detail.      Thank you for your consult. I will follow-up with patient. Please contact us if you have any additional questions.    Haider Mendoza MD  General Surgery  [facility]

## 2024-09-15 NOTE — ASSESSMENT & PLAN NOTE
This patient does have evidence of infective focus  My overall impression is sepsis.  Source:  acute cholecystitis   Antibiotics given-   Antibiotics (72h ago, onward)      Start     Stop Route Frequency Ordered    09/14/24 2100  piperacillin-tazobactam (ZOSYN) 4.5 g in D5W 100 mL IVPB (MB+)         -- IV Every 8 hours (non-standard times) 09/14/24 1533          Latest lactate reviewed-  Recent Labs   Lab 09/14/24  1158   POCLAC 2.76*     Organ dysfunction indicated by Acute liver injury    Fluid challenge Ideal Body Weight- The patient's ideal body weight is Ideal body weight: 48.8 kg (107 lb 8.4 oz) which will be used to calculate fluid bolus of 30 ml/kg for treatment of septic shock.      Post- resuscitation assessment Yes Perfusion exam was performed within 6 hours of septic shock presentation after bolus shows Adequate tissue perfusion assessed by non-invasive monitoring       Will Not start Pressors- Levophed for MAP of 65    Added blood cultures pending

## 2024-09-15 NOTE — NURSING
Returned to the floor.  Stable.  Vitals taken.  5 Lap sites to abdomen c dermabond.  Free of redness, pain, edema

## 2024-09-15 NOTE — OP NOTE
[facility]  Operative Note    SUMMARY     Surgery Date: 9/15/2024     Surgeons and Role:     * Haider Mendoza MD - Primary    Assisting Surgeon: None    Pre-op Diagnosis:  Acute cholecystitis [K81.0]    Post-op Diagnosis:  Post-Op Diagnosis Codes:     * Acute cholecystitis [K81.0]    Procedure(s) (LRB):  ROBOTIC CHOLECYSTECTOMY (N/A)  Add modifier 22 due to severe infection with difficult dissection, bleeding tissues due to friable liver, morbid obesity with BMI at 55, altogether making the operation extremely difficult and time required for safe surgery.    Anesthesia: General    Indication for procedure: Malika Johnson is 44 y.o. female with morbid obesity and RUQ pain and cholecystitis. After discussion of disease process, we discussed options and have elected for operation to perform robotic vs open cholecystectomy.    Description of Procedure: After consent was obtained, patient was taken to the OR. The abdomen was prepped and draped in a standard sterile fashion after general anesthesia was started. Time out was performed. Antibiotics were started with zosyn. We began the procedure by accessing the LUQ with veress needle at palmers point.  We achieved pneumoperitoneum which was tolerated well.  We then placed robotic trocars at the left upper quadrant supraumbilically right upper quadrant and right abdomen.  The gallbladder was noted to be very distended and edematous with omentum overlying it stuck to it.  We peeled down the omentum we used this needle and aspirated proximally 100 cc of clear cloudy bile.  We then retracted the gallbladder.  It was very thickened and edematous we did poke a hole in it so that we would have some then grasped used a Ayden forceps to retract it cephalad.  There was some duodenum and bowel stuck to the midbody extending all the way towards the cystic plate this had to be peeled down with a combination of blunt and cautery dissection.    We then diligently work  to make the critical view of safety the cystic duct.  The thickened wall of the gallbladder and the visceral perineum was so thickened that we are not able to visualize any green structures to help guide with the anatomy.  There was bleeding throughout with a bunch of small vessels we used a suction  throughout the procedure.  We identified the cystic artery several branches and used clips to transect this.  We are then able to identify a ductal structure were not shows the cystic duct so we took down the cystic plate.  There was bleeding and inflammation area difficult to clear out this critical view of safety.    This point I attempted to do a top-down approach so that would help us obtain our critical view of safety.  During this process there was bleeding from the liver and difficult to get the view.  We abandoned this approach and went back.  I continued to dissect and ensured that there was no other structures at the cystic duct was the only structure entering the gallbladder.  Used clips did 2 proximally 1 distally and then transected between the clips.  We then removed the gallbladder off of the gallbladder fossa.  There was scattered scattered areas of pus.  These were irrigated and removed.  During the process we did spill some stones including 1 large conglomerate of stones.  We then removed the specimen with Endo-Catch bag.  We had to stretch the supraumbilical access site for removal of the gallbladder and then we closed this site with 0 Ethibond interrupted sutures to prevent incisional hernia    We did use a suction and irrigated all the spilled contents and blood were hemostatic.  We then removed all the trocars removed the CO2.  We closed the skin in all sites with 4-0 Monocryl after injecting the remainder of the Exparel plus Marcaine.  We placed Dermabond over the incisions        All counts were correct x2. Patient was awakened from anesthesia and taken to the recovery room in a stable  condition having suffered no issues at this time.    Description of the findings of the procedure:  ICG used  Acute cholecystitis  Critical view of safety    ---Add modifier 22 for severity of infection extremely difficult procedure    Estimated Blood Loss: 150 mL         Specimens:   Specimen (24h ago, onward)       Start     Ordered    09/15/24 1625  Specimen to Pathology, Surgery General Surgery  Once        Comments: Pre-op Diagnosis: Acute cholecystitis [K81.0]Procedure(s):ROBOTIC CHOLECYSTECTOMY Number of specimens: 1Name of specimens: GALL BLADDER     References:    Click here for ordering Quick Tip   Question Answer Comment   Procedure Type: General Surgery    Specimen Class: Routine/Screening    Release to patient Immediate        09/15/24 1625                  Drains/Implants:None

## 2024-09-15 NOTE — ASSESSMENT & PLAN NOTE
Patient has Abnormal Magnesium: hypomagnesemia. Will continue to monitor electrolytes closely. Will replace the affected electrolytes and repeat labs to be done after interventions completed. The patient's magnesium results have been reviewed and are listed below.  Recent Labs   Lab 09/15/24  0535   MG 1.3*

## 2024-09-15 NOTE — PLAN OF CARE
Case Management Assessment     PCP: Charo Rasmussen appt this week   Pharmacy: [facility]/ [facility]     Patient Arrived From: home  Existing Help at Home: spouse     Barriers to Discharge: none    Discharge Plan:    A. home   B. home    SW met with patient at the bedside to conduct an assessment. Prior to admission patient was independent with ADLs. No HHC, DME or medication barriers. Patient family will provide DC transportation. No needs or concerns reported at this time.    09/15/24 1303   Discharge Assessment   Assessment Type Discharge Planning Assessment   Confirmed/corrected address, phone number and insurance Yes   Confirmed Demographics Correct on Facesheet   Source of Information patient   When was your last doctors appointment? 05/20/24   Communicated UJSTIN with patient/caregiver Date not available/Unable to determine   Reason For Admission acute cholecystitis   People in Home child(palomo), dependent;spouse   Facility Arrived From: home   Do you expect to return to your current living situation? Yes   Do you have help at home or someone to help you manage your care at home? Yes   Who are your caregiver(s) and their phone number(s)? Daquan Jay    Prior to hospitilization cognitive status: Alert/Oriented   Current cognitive status: Alert/Oriented   Walking or Climbing Stairs Difficulty no   Dressing/Bathing Difficulty no   Do you have any problems with:   (no)   Home Layout Able to live on 1st floor   Equipment Currently Used at Home none   Readmission within 30 days? No   Patient currently being followed by outpatient case management? No   Do you currently have service(s) that help you manage your care at home? No   Do you take prescription medications? Yes   Do you have prescription coverage? Yes   Coverage bcbs   Do you have any problems affording any of your prescribed medications? No   Is the patient taking medications as prescribed? yes   Who is going to help you get home at  discharge? spouse   How do you get to doctors appointments? car, drives self   Are you on dialysis? No   Do you take coumadin? No   Discharge Plan A Home   Discharge Plan B Home   DME Needed Upon Discharge  none   Discharge Plan discussed with: Patient   Transition of Care Barriers None   Physical Activity   On average, how many days per week do you engage in moderate to strenuous exercise (like a brisk walk)? 1 day   On average, how many minutes do you engage in exercise at this level? 20 min   Financial Resource Strain   How hard is it for you to pay for the very basics like food, housing, medical care, and heating? Not hard   Housing Stability   In the last 12 months, was there a time when you were not able to pay the mortgage or rent on time? N   At any time in the past 12 months, were you homeless or living in a shelter (including now)? N   Transportation Needs   Has the lack of transportation kept you from medical appointments, meetings, work or from getting things needed for daily living? No   Food Insecurity   Within the past 12 months, you worried that your food would run out before you got the money to buy more. Never true   Within the past 12 months, the food you bought just didn't last and you didn't have money to get more. Never true   Stress   Do you feel stress - tense, restless, nervous, or anxious, or unable to sleep at night because your mind is troubled all the time - these days? Not at all   Social Isolation   How often do you feel lonely or isolated from those around you?  Never   Alcohol Use   Q1: How often do you have a drink containing alcohol? Never   Utilities   In the past 12 months has the electric, gas, oil, or water company threatened to shut off services in your home? No   Health Literacy   How often do you need to have someone help you when you read instructions, pamphlets, or other written material from your doctor or pharmacy? Never

## 2024-09-15 NOTE — NURSING
Temp: 100.4, ice bag offered. Tylenol given at 2253 for headache. Rechecked temp an hour.     0100: Temp:98.9    Care ongoing.

## 2024-09-15 NOTE — NURSING
[facility]  Nurses Note -- 4 Eyes      9/14/2024       Skin assessed on: Q Shift      [x] No Pressure Injuries Present    []Prevention Measures Documented    [] Yes LDA  for Pressure Injury Previously documented     [] Yes New Pressure Injury Discovered   [] LDA for New Pressure Injury Added      Attending RN:  Milana Flores RN     Second RN:  ISAÍAS Schneider

## 2024-09-15 NOTE — PROGRESS NOTES
"[facility] Medicine  Progress Note    Patient Name: Malika Johnson  MRN: [id number]  Patient Class: IP- Inpatient   Admission Date: 9/14/2024  Length of Stay: 0 days  Attending Physician: Pranay Harrison DO  Primary Care Provider: Kev Slater MD        Subjective:     Principal Problem:Acute cholecystitis        HPI:  44 year old female with past medical history of Graves disease presented to the ED at [facility] with c/o abdominal pain since last night. Patient describes her pain as "tightness" in character across her entire abdomen. She reports NVD last week which resolved. However, she started experiencing nausea again last night and was unable to consume any food as a result. She had a soft BM yesterday after taking a laxative. No other exacerbating or alleviating factors. Denies chest pain, or other associated symptoms. In the ED, WBC 19.9 Lactic acid is elevated at 2.76. US shows: Cholelithiasis with sonographic findings suggesting cholecystitis. Liver findings suggestive of intrinsic liver disease/steatosis. Ill-defined hypoechoic liver mass.  EKG showed NSR 84 bpm. Patient transferred to [facility] for evaluation by General surgery.     Overview/Hospital Course:  No notes on file    Interval History: patient seen and examined. She looks ill-appearance this morning but denies abdominal pain. Surgery today. Replace electrolytes.     Review of Systems   Gastrointestinal:  Positive for abdominal pain, diarrhea, nausea and vomiting.   All other systems reviewed and are negative.    Objective:     Vital Signs (Most Recent):  Temp: 98.7 °F (37.1 °C) (09/15/24 0809)  Pulse: 101 (09/15/24 0809)  Resp: 20 (09/15/24 0809)  BP: 115/80 (09/15/24 0809)  SpO2: 96 % (09/15/24 0809) Vital Signs (24h Range):  Temp:  [98.4 °F (36.9 °C)-102 °F (38.9 °C)] 98.7 °F (37.1 °C)  Pulse:  [] 101  Resp:  [] 20  SpO2:  [92 %-98 %] 96 %  BP: (113-166)/(67-95) 115/80     Weight: 123 kg (271 lb 2.7 " oz)  Body mass index is 54.77 kg/m².    Intake/Output Summary (Last 24 hours) at 9/15/2024 0955  Last data filed at 9/15/2024 0901  Gross per 24 hour   Intake 101.83 ml   Output --   Net 101.83 ml         Physical Exam  Constitutional:       General: She is not in acute distress.     Appearance: She is obese. She is ill-appearing.   HENT:      Head: Normocephalic and atraumatic.      Mouth/Throat:      Mouth: Mucous membranes are dry.   Eyes:      Extraocular Movements: Extraocular movements intact.   Cardiovascular:      Rate and Rhythm: Normal rate and regular rhythm.   Abdominal:      Palpations: Abdomen is soft.   Musculoskeletal:         General: Normal range of motion.      Cervical back: Normal range of motion.   Skin:     General: Skin is warm and dry.   Neurological:      Mental Status: She is alert and oriented to person, place, and time. Mental status is at baseline.   Psychiatric:         Mood and Affect: Mood normal.         Behavior: Behavior normal.         Thought Content: Thought content normal.         Judgment: Judgment normal.             Significant Labs: All pertinent labs within the past 24 hours have been reviewed.    Significant Imaging: I have reviewed all pertinent imaging results/findings within the past 24 hours.    Assessment/Plan:      * Acute cholecystitis  Consult general surgery  NPO   IVF   Start on prophylaxis Zosyn  Pain control   Antiemetics as needed   Surgery today         Hypomagnesemia  Patient has Abnormal Magnesium: hypomagnesemia. Will continue to monitor electrolytes closely. Will replace the affected electrolytes and repeat labs to be done after interventions completed. The patient's magnesium results have been reviewed and are listed below.  Recent Labs   Lab 09/15/24  0535   MG 1.3*        Hypophosphatemia  Patient has Abnormal Phosphorus: hypophosphatemia. Will continue to monitor electrolytes closely. Will replace the affected electrolytes and repeat labs to be done  after interventions completed. The patient's phosphorus results have been reviewed and are listed below.  Recent Labs   Lab 09/15/24  0535   PHOS 1.7*        Sepsis  This patient does have evidence of infective focus  My overall impression is sepsis.  Source:  acute cholecystitis   Antibiotics given-   Antibiotics (72h ago, onward)      Start     Stop Route Frequency Ordered    09/14/24 2100  piperacillin-tazobactam (ZOSYN) 4.5 g in D5W 100 mL IVPB (MB+)         -- IV Every 8 hours (non-standard times) 09/14/24 1533          Latest lactate reviewed-  Recent Labs   Lab 09/14/24  1158   POCLAC 2.76*     Organ dysfunction indicated by Acute liver injury    Fluid challenge Ideal Body Weight- The patient's ideal body weight is Ideal body weight: 48.8 kg (107 lb 8.4 oz) which will be used to calculate fluid bolus of 30 ml/kg for treatment of septic shock.      Post- resuscitation assessment Yes Perfusion exam was performed within 6 hours of septic shock presentation after bolus shows Adequate tissue perfusion assessed by non-invasive monitoring       Will Not start Pressors- Levophed for MAP of 65    Added blood cultures pending       Abnormal CT of liver  US of abdomen showed Ill-defined hypoechoic liver mass   Will refer to hepatology on outpatient  Will order CT or MRI outpatient           Leukocytosis  Secondary to above  WBCs increased, on antibiotics . Need gallbladder removed.   Trend level      Obesity  Body mass index is 50.49 kg/m². Morbid obesity complicates all aspects of disease management from diagnostic modalities to treatment. Weight loss encouraged and health benefits explained to patient.         Primary hypertension  History of hypertension  Not on any medication   Will monitor for now  Controlled today     Attention deficit disorder (ADD) in adult  PTA Adderrall on hold       Graves disease  Doesn't take any medication  Follows endocrinologist outpatient         VTE Risk Mitigation (From admission,  onward)           Ordered     IP VTE HIGH RISK PATIENT  Once         09/14/24 1519     Place sequential compression device  Until discontinued         09/14/24 1519     Reason for No Pharmacological VTE Prophylaxis  Once        Question:  Reasons:  Answer:  Risk of Bleeding    09/14/24 1519                    Discharge Planning   JUSTIN:      Code Status: Full Code   Is the patient medically ready for discharge?:     Reason for patient still in hospital (select all that apply): Patient trending condition and Consult recommendations                     Carla Cross NP  Department of Hospital Medicine   [facility]

## 2024-09-16 VITALS
WEIGHT: 271.19 LBS | DIASTOLIC BLOOD PRESSURE: 62 MMHG | SYSTOLIC BLOOD PRESSURE: 119 MMHG | RESPIRATION RATE: 18 BRPM | OXYGEN SATURATION: 93 % | BODY MASS INDEX: 54.67 KG/M2 | TEMPERATURE: 98 F | HEART RATE: 99 BPM | HEIGHT: 59 IN

## 2024-09-16 LAB
ALBUMIN SERPL BCP-MCNC: 2.5 G/DL (ref 3.5–5.2)
ALP SERPL-CCNC: 75 U/L (ref 55–135)
ALT SERPL W/O P-5'-P-CCNC: 26 U/L (ref 10–44)
ANION GAP SERPL CALC-SCNC: 10 MMOL/L (ref 8–16)
AST SERPL-CCNC: 29 U/L (ref 10–40)
BASOPHILS # BLD AUTO: ABNORMAL K/UL (ref 0–0.2)
BASOPHILS NFR BLD: 0 % (ref 0–1.9)
BILIRUB SERPL-MCNC: 0.8 MG/DL (ref 0.1–1)
BUN SERPL-MCNC: 5 MG/DL (ref 6–20)
CALCIUM SERPL-MCNC: 8.2 MG/DL (ref 8.7–10.5)
CHLORIDE SERPL-SCNC: 105 MMOL/L (ref 95–110)
CO2 SERPL-SCNC: 20 MMOL/L (ref 23–29)
CREAT SERPL-MCNC: 0.6 MG/DL (ref 0.5–1.4)
DIFFERENTIAL METHOD BLD: ABNORMAL
EOSINOPHIL # BLD AUTO: ABNORMAL K/UL (ref 0–0.5)
EOSINOPHIL NFR BLD: 0 % (ref 0–8)
ERYTHROCYTE [DISTWIDTH] IN BLOOD BY AUTOMATED COUNT: 13.2 % (ref 11.5–14.5)
EST. GFR  (NO RACE VARIABLE): >60 ML/MIN/1.73 M^2
ESTIMATED AVG GLUCOSE: 105 MG/DL (ref 68–131)
GLUCOSE SERPL-MCNC: 142 MG/DL (ref 70–110)
HBA1C MFR BLD: 5.3 % (ref 4–5.6)
HCT VFR BLD AUTO: 34 % (ref 37–48.5)
HGB BLD-MCNC: 11.1 G/DL (ref 12–16)
IMM GRANULOCYTES # BLD AUTO: ABNORMAL K/UL (ref 0–0.04)
IMM GRANULOCYTES NFR BLD AUTO: ABNORMAL % (ref 0–0.5)
LYMPHOCYTES # BLD AUTO: ABNORMAL K/UL (ref 1–4.8)
LYMPHOCYTES NFR BLD: 5 % (ref 18–48)
MCH RBC QN AUTO: 30.6 PG (ref 27–31)
MCHC RBC AUTO-ENTMCNC: 32.6 G/DL (ref 32–36)
MCV RBC AUTO: 94 FL (ref 82–98)
MONOCYTES # BLD AUTO: ABNORMAL K/UL (ref 0.3–1)
MONOCYTES NFR BLD: 7 % (ref 4–15)
NEUTROPHILS NFR BLD: 77 % (ref 38–73)
NEUTS BAND NFR BLD MANUAL: 11 %
NRBC BLD-RTO: 0 /100 WBC
PLATELET # BLD AUTO: 261 K/UL (ref 150–450)
PLATELET BLD QL SMEAR: ABNORMAL
PMV BLD AUTO: 10.4 FL (ref 9.2–12.9)
POTASSIUM SERPL-SCNC: 3.9 MMOL/L (ref 3.5–5.1)
PROT SERPL-MCNC: 6.9 G/DL (ref 6–8.4)
RBC # BLD AUTO: 3.63 M/UL (ref 4–5.4)
SODIUM SERPL-SCNC: 135 MMOL/L (ref 136–145)
WBC # BLD AUTO: 26.84 K/UL (ref 3.9–12.7)

## 2024-09-16 PROCEDURE — 63600175 PHARM REV CODE 636 W HCPCS: Performed by: NURSE PRACTITIONER

## 2024-09-16 PROCEDURE — 94760 N-INVAS EAR/PLS OXIMETRY 1: CPT

## 2024-09-16 PROCEDURE — 36415 COLL VENOUS BLD VENIPUNCTURE: CPT | Performed by: HOSPITALIST

## 2024-09-16 PROCEDURE — 99900035 HC TECH TIME PER 15 MIN (STAT)

## 2024-09-16 PROCEDURE — 85027 COMPLETE CBC AUTOMATED: CPT | Performed by: HOSPITALIST

## 2024-09-16 PROCEDURE — 25000003 PHARM REV CODE 250: Performed by: NURSE PRACTITIONER

## 2024-09-16 PROCEDURE — 85007 BL SMEAR W/DIFF WBC COUNT: CPT | Performed by: HOSPITALIST

## 2024-09-16 PROCEDURE — 83036 HEMOGLOBIN GLYCOSYLATED A1C: CPT | Performed by: HOSPITALIST

## 2024-09-16 PROCEDURE — 80053 COMPREHEN METABOLIC PANEL: CPT | Performed by: HOSPITALIST

## 2024-09-16 RX ADMIN — OXYCODONE HYDROCHLORIDE AND ACETAMINOPHEN 1 TABLET: 5; 325 TABLET ORAL at 06:09

## 2024-09-16 RX ADMIN — PANTOPRAZOLE SODIUM 40 MG: 40 INJECTION, POWDER, LYOPHILIZED, FOR SOLUTION INTRAVENOUS at 08:09

## 2024-09-16 RX ADMIN — PIPERACILLIN AND TAZOBACTAM 4.5 G: 4; .5 INJECTION, POWDER, LYOPHILIZED, FOR SOLUTION INTRAVENOUS; PARENTERAL at 12:09

## 2024-09-16 RX ADMIN — PIPERACILLIN AND TAZOBACTAM 4.5 G: 4; .5 INJECTION, POWDER, LYOPHILIZED, FOR SOLUTION INTRAVENOUS; PARENTERAL at 08:09

## 2024-09-16 NOTE — PLAN OF CARE
Problem: Adult Inpatient Plan of Care  Goal: Plan of Care Review  Outcome: Met  Goal: Patient-Specific Goal (Individualized)  Outcome: Met  Goal: Absence of Hospital-Acquired Illness or Injury  Outcome: Met  Goal: Optimal Comfort and Wellbeing  Outcome: Met  Goal: Readiness for Transition of Care  Outcome: Met     Problem: Bariatric Environmental Safety  Goal: Safety Maintained with Care  Outcome: Met     Problem: Infection  Goal: Absence of Infection Signs and Symptoms  Outcome: Met     Problem: Sepsis/Septic Shock  Goal: Optimal Coping  Outcome: Met  Goal: Absence of Bleeding  Outcome: Met  Goal: Blood Glucose Level Within Targeted Range  Outcome: Met  Goal: Absence of Infection Signs and Symptoms  Outcome: Met  Goal: Optimal Nutrition Intake  Outcome: Met     Problem: Wound  Goal: Optimal Coping  Outcome: Met  Goal: Optimal Functional Ability  Outcome: Met  Goal: Absence of Infection Signs and Symptoms  Outcome: Met  Goal: Improved Oral Intake  Outcome: Met  Goal: Optimal Pain Control and Function  Outcome: Met  Goal: Skin Health and Integrity  Outcome: Met  Goal: Optimal Wound Healing  Outcome: Met

## 2024-09-16 NOTE — DISCHARGE SUMMARY
"[facility] Medicine  Discharge Summary      Patient Name: Malika Johnson  MRN: [id number]  [facility]: [id number]  Patient Class: IP- Inpatient  Admission Date: 9/14/2024  Hospital Length of Stay: 1 days  Discharge Date and Time:  09/16/2024 12:03 PM  Attending Physician: Pranay Harrison DO   Discharging Provider: Carla Cross NP  Primary Care Provider: Kev Slater MD    Primary Care Team: Networked reference to record PCT     HPI:   44 year old female with past medical history of Graves disease presented to the ED at [facility] with c/o abdominal pain since last night. Patient describes her pain as "tightness" in character across her entire abdomen. She reports NVD last week which resolved. However, she started experiencing nausea again last night and was unable to consume any food as a result. She had a soft BM yesterday after taking a laxative. No other exacerbating or alleviating factors. Denies chest pain, or other associated symptoms. In the ED, WBC 19.9 Lactic acid is elevated at 2.76. US shows: Cholelithiasis with sonographic findings suggesting cholecystitis. Liver findings suggestive of intrinsic liver disease/steatosis. Ill-defined hypoechoic liver mass.  EKG showed NSR 84 bpm. Patient transferred to [facility] for evaluation by General surgery.     Procedure(s) (LRB):  ROBOTIC CHOLECYSTECTOMY (N/A)      Hospital Course:   Admitted for acute cholecystitis. Patient had lap marce on 9/15/24. WBC elevated. On IV Zosyn. Patient got upset with nursing staff and want to leave AMA. Patient informed that her WBC or severely elevated. She states she doesn't care she will call her PCP for abx. She refuses to stay. Patient was educated on the risk of leaving AMA. She signed AMA.      Goals of Care Treatment Preferences:  Code Status: Full Code      [facility] Screening:  The patient was screened for utility difficulties, food insecurity, transport difficulties, housing insecurity, and " interpersonal safety and there were no concerns identified this admission.     Consults:   Consults (From admission, onward)          Status Ordering Provider     Inpatient consult to General Surgery  Once        Provider:  Haider Mendoza MD    Completed EDYTA MCDERMOTT            No new Assessment & Plan notes have been filed under this hospital service since the last note was generated.  Service: Hospital Medicine    Final Active Diagnoses:    Diagnosis Date Noted POA    PRINCIPAL PROBLEM:  Acute cholecystitis [K81.0] 09/14/2024 Unknown    Abnormal CT of liver [R93.2] 09/15/2024 Unknown    Sepsis [A41.9] 09/15/2024 Unknown    Hypophosphatemia [E83.39] 09/15/2024 Unknown    Hypomagnesemia [E83.42] 09/15/2024 Unknown    Leukocytosis [D72.829] 09/14/2024 Unknown    Attention deficit disorder (ADD) in adult [F98.8] 03/28/2023 Yes    Obesity [E66.9] 05/10/2022 Yes    Primary hypertension [I10] 05/10/2022 Yes    Graves disease [E05.00]  Yes      Problems Resolved During this Admission:       Discharged Condition: stable    Disposition: Left Against Medical Adv*    Follow Up:   Follow-up Information       Haider Mendoza MD Follow up in 1 week(s).    Specialties: General Surgery, Surgery  Contact information:  [address]  [address] [place], [place] [id number] [phone]                           Patient Instructions:   No discharge procedures on file.    Significant Diagnostic Studies:     Pending Diagnostic Studies:       Procedure Component Value Units Date/Time    Specimen to Pathology, Surgery General Surgery [[id number]] Collected: 09/15/24 1625    Order Status: Sent Lab Status: In process Updated: 09/15/24 1625    Specimen: Tissue            Medications:      Indwelling Lines/Drains at time of discharge:   Lines/Drains/Airways       None                   Time spent on the discharge of patient: 30 minutes         Carla Cross NP  Department of Hospital Medicine  [facility]urg

## 2024-09-16 NOTE — DISCHARGE INSTRUCTIONS
You may shower 24 hours after surgery.    Do not scrub the incision sites but let soapy water run over them. Pat dry with a newly laundered towel.    Do not lift anything heavier than a gallon of milk (8-10 lbs) for two weeks. Follow up with your surgeon as scheduled.    Call your doctor for any signs of infection such as chills and fever greater than 100.4, redness, swelling, warmth, oozing, bleeding or foul odor from the incision sites.

## 2024-09-16 NOTE — NURSING
[facility]  Nurses Note -- 4 Eyes        Date: 09/16/2024        Skin assessed on: Discharge        [x] No Pressure Injuries Present                 []Prevention Measures Documented     [] Yes LDA Previously documented      [] Yes New Pressure Injury Discovered              [] LDA Added        Attending RN: ISAÍAS Lainez     Second RN:  LI Begum

## 2024-09-16 NOTE — NURSING
[facility]  Nurses Note -- 4 Eyes    Received pt awake on bed. On RA not in distress. With 5x lap sites, intact and clean. With pain noted. Pain given by AM nurse. Pillow given as support. HOB elevated. Bed in low position. Call light within reach.   9/15/2024       Skin assessed on: Q Shift      [x] No Pressure Injuries Present    [x]Prevention Measures Documented    [] Yes LDA  for Pressure Injury Previously documented     [] Yes New Pressure Injury Discovered   [] LDA for New Pressure Injury Added      Attending RN:  Milana Flores RN     Second RN:  Manuela RN

## 2024-09-16 NOTE — PLAN OF CARE
09/16/24 1216   Final Note   Assessment Type Final Discharge Note   Anticipated Discharge Disposition Left Against

## 2024-09-16 NOTE — HOSPITAL COURSE
Admitted for acute cholecystitis. Patient had lap marce on 9/15/24. WBC elevated. On IV Zosyn. Patient got upset with nursing staff and want to leave AMA. Patient informed that her WBC or severely elevated. She states she doesn't care she will call her PCP for abx. She refuses to stay. Patient was educated on the risk of leaving AMA. She signed AMA.

## 2024-09-16 NOTE — PLAN OF CARE
Problem: Adult Inpatient Plan of Care  Goal: Plan of Care Review  Outcome: Progressing  Flowsheets (Taken 9/16/2024 0452)  Plan of Care Reviewed With: patient  Goal: Optimal Comfort and Wellbeing  Outcome: Progressing  Intervention: Monitor Pain and Promote Comfort  Flowsheets (Taken 9/16/2024 0452)  Pain Management Interventions:   pillow support provided   quiet environment facilitated     Problem: Bariatric Environmental Safety  Goal: Safety Maintained with Care  Outcome: Progressing     Problem: Infection  Goal: Absence of Infection Signs and Symptoms  Outcome: Progressing

## 2024-09-16 NOTE — NURSING
Patient signed AMA papers with provider, discharge paperwork handed to pt. IV discontinued and catheter intact. Vital signs stable, NADN, and afebrile. Cardiac monitoring dc. Discharged home with family at bedside.

## 2024-09-16 NOTE — ANESTHESIA POSTPROCEDURE EVALUATION
Anesthesia Post Evaluation    Patient: Malika Johnson    Procedure(s) Performed: Procedure(s) (LRB):  ROBOTIC CHOLECYSTECTOMY (N/A)    Final Anesthesia Type: general      Patient location during evaluation: floor  Patient participation: Yes- Able to Participate  Level of consciousness: awake and alert  Post-procedure vital signs: reviewed and stable  Pain management: adequate  Airway patency: patent    PONV status at discharge: No PONV  Anesthetic complications: no      Cardiovascular status: hemodynamically stable and blood pressure returned to baseline  Respiratory status: room air, spontaneous ventilation and unassisted  Hydration status: euvolemic  Follow-up not needed.              Vitals Value Taken Time   /62 09/16/24 0537   Temp 36.8 °C (98.3 °F) 09/16/24 0537   Pulse 83 09/16/24 0537   Resp 18 09/16/24 0603   SpO2 93 % 09/16/24 0537         Event Time   Out of Recovery 09/15/2024 17:49:00         Pain/Michel Score: Pain Rating Prior to Med Admin: 7 (9/16/2024  6:03 AM)  Pain Rating Post Med Admin: 4 (9/15/2024  7:31 PM)  Michel Score: 10 (9/15/2024  5:45 PM)

## 2024-09-19 LAB
BACTERIA BLD CULT: NORMAL
BACTERIA BLD CULT: NORMAL
FINAL PATHOLOGIC DIAGNOSIS: NORMAL
GROSS: NORMAL
Lab: NORMAL

## 2024-09-23 ENCOUNTER — OFFICE VISIT (OUTPATIENT)
Dept: SURGERY | Facility: CLINIC | Age: 44
End: 2024-09-23
Payer: COMMERCIAL

## 2024-09-23 VITALS
HEART RATE: 89 BPM | BODY MASS INDEX: 54.67 KG/M2 | HEIGHT: 59 IN | SYSTOLIC BLOOD PRESSURE: 106 MMHG | WEIGHT: 271.19 LBS | DIASTOLIC BLOOD PRESSURE: 82 MMHG

## 2024-09-23 DIAGNOSIS — K81.0 ACUTE CHOLECYSTITIS: Primary | ICD-10-CM

## 2024-09-23 PROCEDURE — 99024 POSTOP FOLLOW-UP VISIT: CPT | Mod: S$GLB,,, | Performed by: SURGERY

## 2024-09-23 PROCEDURE — 99999 PR PBB SHADOW E&M-EST. PATIENT-LVL III: CPT | Mod: PBBFAC,,, | Performed by: SURGERY

## 2024-09-23 PROCEDURE — 1159F MED LIST DOCD IN RCRD: CPT | Mod: CPTII,S$GLB,, | Performed by: SURGERY

## 2024-09-23 PROCEDURE — 3074F SYST BP LT 130 MM HG: CPT | Mod: CPTII,S$GLB,, | Performed by: SURGERY

## 2024-09-23 PROCEDURE — 3079F DIAST BP 80-89 MM HG: CPT | Mod: CPTII,S$GLB,, | Performed by: SURGERY

## 2024-09-23 PROCEDURE — 3044F HG A1C LEVEL LT 7.0%: CPT | Mod: CPTII,S$GLB,, | Performed by: SURGERY

## 2024-09-23 NOTE — PHYSICIAN QUERY
PATIENT: Tesha Rosas  AGE: 56 year old   SEX:  female  MRN:  [id number]    ENCOUNTER DATE: 11/1/2023    HISTORY     ARRIVAL MODE    Amb-[facility]       HISTORY LIMITATION   None         CHIEF COMPLAINT   Syncope and Leg Pain        HISTORY OF PRESENT ILLNESS   Tesha Rosas is a 56 year old year old female with history of alcohol abuse, anemia, asthma, gastric bypass who presents to the Emergency Department with right lower leg pain after fall. Patient states she was lightheaded this afternoon. States she had syncopal episode and fell. When she awoke she noted leg pain. Patient states she also has neck pain. EMS placed patient in c-collar. Patient given fentanyl for pain. Patient now complains of chest pain. Denies any headache, current lightheadedness, fever, other areas of pain or discomfort or other concerns.    Patient states she takes her \"heart medications\" but has not taken her xarelto for 9 months.       Additional information regarding the patient's history was obtained in discussion with: , EMS personnel    Previous patient care records reviewed included:, the [employer] 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   2020-05: Long term current use of amiodarone  2020-05: LVEF 59%, 4/17/2020 2020-05: Dilated cardiomyopathy (CMS/[facility]), tachycardia induced,   resolved  2020-05: Left atrial enlargement  2020-05: History of pulmonary embolism, 11/2019 2020-01: Atrial fibrillation (CMD)  2020-01: Acute systolic (congestive) heart failure (CMD)  2019-11: Tobacco use  2019-11: Dental caries  2019-11: Asthma  2019-11: COPD (chronic obstructive pulmonary disease) (CMS/HCC)  2019-11:  Please provide the diagnosis or diagnoses associated with the clinical findings (see query):  Dehydration    Murmur, cardiac  2019-11: PE (pulmonary embolism)  2019-11: Claustrophobia  2019-11: PUD (peptic ulcer disease)  2019-11: Status post cholecystectomy  2019-11: Status post gastric bypass for obesity  2019-11: Lung mass, LLL  2019-11: Anxiety and depression  2019-11: Rhinosinusitis  2019-11: Anemia        MEDICATIONS   Current Outpatient Medications   Medication Instructions   • acetaminophen (TYLENOL) 1,000 mg, Oral, PRN   • Advair Diskus 100-50 MCG/ACT inhaler 1 puff, Inhalation, 2 TIMES DAILY RESPIRATORY   • albuterol 108 (90 Base) MCG/ACT inhaler 1 puff, Inhalation, EVERY 4 HOURS PRN   • carvedilol (COREG) 25 mg, Oral, 2 TIMES DAILY   • diclofenac (VOLTAREN) 1 % gel Topical, DAILY   • famotidine (PEPCID) 20 mg, Oral, 3 TIMES DAILY   • traMADol (ULTRAM) 50 mg, Oral, PRN   • Xarelto 20 mg, Oral, DAILY        ALLERGIES   ALLERGIES:  Patient has no known allergies.     FAMILY HISTORY   Relevant family history has been mentioned in the HPI or was otherwise deemed not pertinent to today's reason for visit.     SOCIAL  Social History     Tobacco Use   • Smoking status: Some Days     Current packs/day: 0.10     Average packs/day: 0.1 packs/day for 3.0 years (0.3 ttl pk-yrs)     Types: Cigarettes   • Smokeless tobacco: Never   • Tobacco comments:     Is trying to cut down.  Wants to do it on her own   Vaping Use   • Vaping Use: never used   Substance Use Topics   • Alcohol use: Yes     Alcohol/week: 2.0 standard drinks of alcohol     Types: 2 Cans of beer per week     Comment: occasionally - once a month   • Drug use: No         ROS Pertinent findings mentioned in the HPI.     EXAMINATION     VITAL SIGNS   Patient Vitals for the past 24 hrs:   BP Temp Temp src Pulse Resp SpO2 Height Weight   11/02/23 1828 (!) 131/90 97.2 °F (36.2 °C) Temporal (!) 58 18 96 % -- --   11/02/23 1733 -- -- -- 63 18 96 % -- --   11/02/23 1730 125/85 98 °F (36.7 °C) Temporal (!) 60 18 95 % -- --   11/02/23 1710 136/70 97.1 °F (36.2 °C) Temporal  (!) 56 19 97 % -- --   11/02/23 1707 125/78 -- -- (!) 58 18 97 % -- --   11/02/23 1702 125/85 -- -- 61 18 98 % -- --   11/02/23 1657 127/85 -- -- 63 18 97 % -- --   11/02/23 1652 118/73 98 °F (36.7 °C) Temporal 65 (!) 20 98 % -- --   11/02/23 1647 115/72 -- -- 68 18 98 % -- --   11/02/23 1642 120/71 -- -- 68 14 100 % -- --   11/02/23 1637 114/71 97.7 °F (36.5 °C) Temporal 71 13 100 % -- --   11/02/23 1423 -- 99.8 °F (37.7 °C) Temporal -- -- -- -- --   11/02/23 1330 -- 99.1 °F (37.3 °C) Temporal -- -- -- -- --   11/02/23 1154 119/86 97.6 °F (36.4 °C) Temporal 79 16 95 % -- --   11/02/23 1025 -- -- -- -- 16 -- -- --   11/02/23 0850 -- -- -- -- 16 -- -- --   11/02/23 0750 120/76 -- -- 76 16 -- -- --   11/02/23 0642 -- -- -- -- -- 97 % -- --   11/02/23 0526 -- 97.9 °F (36.6 °C) -- 78 16 97 % -- --   11/02/23 0041 110/73 98.4 °F (36.9 °C) -- 74 16 98 % -- --   11/02/23 0031 -- -- -- -- 16 -- -- --   11/01/23 2342 -- -- -- -- 16 -- -- --   11/01/23 2319 -- -- -- -- -- -- 5' 10\" (1.778 m) 75.5 kg (166 lb 7.2 oz)   11/01/23 2300 (!) 146/74 96.6 °F (35.9 °C) Temporal 87 18 98 % -- --   11/01/23 2230 (!) 146/74 96.6 °F (35.9 °C) Temporal 76 16 97 % -- --   11/01/23 2215 (!) 154/72 -- -- 80 -- 95 % -- --   11/01/23 2200 127/89 -- -- 71 -- 96 % -- --   11/01/23 2145 118/73 -- -- 67 16 97 % -- --   11/01/23 2130 126/76 -- -- 72 -- 99 % -- --   11/01/23 2115 133/70 -- -- 70 -- 98 % -- --   11/01/23 2100 (!) 143/73 -- -- 70 14 99 % -- --   11/01/23 2030 131/75 -- -- 72 -- 98 % -- --   11/01/23 2015 128/72 -- -- 73 -- 97 % -- --   11/01/23 2000 136/75 -- -- 69 -- 97 % -- --   11/01/23 1945 119/75 -- -- 72 -- 98 % -- --        PHYSICAL EXAM   Physical Exam  Vitals and nursing note reviewed.   Constitutional:       General: She is not in acute distress.     Appearance: Normal appearance. She is not ill-appearing, toxic-appearing or diaphoretic.   HENT:      Head: Normocephalic and atraumatic.      Nose: Nose normal.       Mouth/Throat:      Mouth: Mucous membranes are moist.      Neck: Tenderness present.   Eyes:      Pupils: Pupils are equal, round, and reactive to light.   Neck:      Comments: In cervical collar  Cardiovascular:      Rate and Rhythm: Normal rate and regular rhythm.      Heart sounds: Normal heart sounds.   Pulmonary:      Effort: Pulmonary effort is normal.      Breath sounds: Normal breath sounds.   Abdominal:      General: Bowel sounds are normal.      Palpations: Abdomen is soft.      Tenderness: There is no abdominal tenderness.   Musculoskeletal:      Comments: Right lower leg deformity and ecchymosis noted. Pedal pulse not palpable. Doppler used to find pulse. Able to hear PT pulse. Cap refill 3 seconds. Senstaion to light touch intact. Limited ROM in the leg secondary to pain.   Skin:     General: Skin is warm and dry.      Coloration: Skin is pale.   Neurological:      General: No focal deficit present.      Mental Status: She is alert and oriented to person, place, and time.   Psychiatric:         Mood and Affect: Mood normal.         Behavior: Behavior normal.         Thought Content: Thought content normal.         Judgment: Judgment normal.          DIAGNOSTICS AND PROCEDURES     LABS   Results for orders placed or performed during the hospital encounter of 11/01/23   Comprehensive Metabolic Panel   Result Value Ref Range    Fasting Status      Sodium 139 135 - 145 mmol/L    Potassium 4.0 3.4 - 5.1 mmol/L    Chloride 105 97 - 110 mmol/L    Carbon Dioxide 23 21 - 32 mmol/L    Anion Gap 15 7 - 19 mmol/L    Glucose 139 (H) 70 - 99 mg/dL    BUN 8 6 - 20 mg/dL    Creatinine 0.71 0.51 - 0.95 mg/dL    Glomerular Filtration Rate >90 >=60    BUN/Cr 11 7 - 25    Calcium 8.6 8.4 - 10.2 mg/dL    Bilirubin, Total 0.6 0.2 - 1.0 mg/dL    GOT/AST 30 <=37 Units/L    GPT/ALT 22 <64 Units/L    Alkaline Phosphatase 131 (H) 45 - 117 Units/L    Albumin 2.2 (L) 3.6 - 5.1 g/dL    Protein, Total 6.2 (L) 6.4 - 8.2 g/dL     Globulin 4.0 2.0 - 4.0 g/dL    A/G Ratio 0.6 (L) 1.0 - 2.4   TROPONIN I, HIGH SENSITIVITY   Result Value Ref Range    Troponin I, High Sensitivity 7 <52 ng/L   Prothrombin Time (INR/PT)   Result Value Ref Range    Protime- PT 11.8 9.7 - 11.8 sec    INR 1.1     Partial Thromboplastin Time (PTT)   Result Value Ref Range    PTT 21 (L) 22 - 32 sec   Magnesium   Result Value Ref Range    Magnesium 2.0 1.7 - 2.4 mg/dL   CBC with Automated Differential (performable only)   Result Value Ref Range    WBC 4.2 4.2 - 11.0 K/mcL    RBC 4.40 4.00 - 5.20 mil/mcL    HGB 10.5 (L) 12.0 - 15.5 g/dL    HCT 35.6 (L) 36.0 - 46.5 %    MCV 80.9 78.0 - 100.0 fl    MCH 23.9 (L) 26.0 - 34.0 pg    MCHC 29.5 (L) 32.0 - 36.5 g/dL    RDW-CV 22.8 (H) 11.0 - 15.0 %    RDW-SD 66.1 (H) 39.0 - 50.0 fL     140 - 450 K/mcL    NRBC 0 <=0 /100 WBC    Neutrophil, Percent 51 %    Lymphocytes, Percent 25 %    Mono, Percent 20 %    Eosinophils, Percent 2 %    Basophils, Percent 2 %    Immature Granulocytes 0 %    Absolute Neutrophils 2.1 1.8 - 7.7 K/mcL    Absolute Lymphocytes 1.1 1.0 - 4.0 K/mcL    Absolute Monocytes 0.8 0.3 - 0.9 K/mcL    Absolute Eosinophils  0.1 0.0 - 0.5 K/mcL    Absolute Basophils 0.1 0.0 - 0.3 K/mcL    Absolute Immature Granulocytes 0.0 0.0 - 0.2 K/mcL   Gold Top   Result Value Ref Range    Extra Tube Hold for Add Ons    Alcohol   Result Value Ref Range    Alcohol None Detected None Detected mg/dL   Basic Metabolic Panel   Result Value Ref Range    Fasting Status      Sodium 136 135 - 145 mmol/L    Potassium 3.7 3.4 - 5.1 mmol/L    Chloride 106 97 - 110 mmol/L    Carbon Dioxide 23 21 - 32 mmol/L    Anion Gap 11 7 - 19 mmol/L    Glucose 91 70 - 99 mg/dL    BUN 9 6 - 20 mg/dL    Creatinine 0.65 0.51 - 0.95 mg/dL    Glomerular Filtration Rate >90 >=60    BUN/Cr 14 7 - 25    Calcium 8.2 (L) 8.4 - 10.2 mg/dL   CBC with Automated Differential (performable only)   Result Value Ref Range    WBC 6.1 4.2 - 11.0 K/mcL    RBC 4.01 4.00 -  5.20 mil/mcL    HGB 9.8 (L) 12.0 - 15.5 g/dL    HCT 32.6 (L) 36.0 - 46.5 %    MCV 81.3 78.0 - 100.0 fl    MCH 24.4 (L) 26.0 - 34.0 pg    MCHC 30.1 (L) 32.0 - 36.5 g/dL    RDW-CV 22.5 (H) 11.0 - 15.0 %    RDW-SD 66.0 (H) 39.0 - 50.0 fL     140 - 450 K/mcL    NRBC 0 <=0 /100 WBC    Neutrophil, Percent 38 %    Lymphocytes, Percent 37 %    Mono, Percent 22 %    Eosinophils, Percent 2 %    Basophils, Percent 1 %    Immature Granulocytes 0 %    Absolute Neutrophils 2.3 1.8 - 7.7 K/mcL    Absolute Lymphocytes 2.3 1.0 - 4.0 K/mcL    Absolute Monocytes 1.4 (H) 0.3 - 0.9 K/mcL    Absolute Eosinophils  0.1 0.0 - 0.5 K/mcL    Absolute Basophils 0.1 0.0 - 0.3 K/mcL    Absolute Immature Granulocytes 0.0 0.0 - 0.2 K/mcL   Hemoglobin and Hematocrit   Result Value Ref Range    HGB 9.9 (L) 12.0 - 15.5 g/dL    HCT 33.5 (L) 36.0 - 46.5 %   TYPE/SCREEN   Result Value Ref Range    ABO/RH(D) A Rh Positive     ANTIBODY SCREEN Negative     TYPE AND SCREEN EXPIRATION DATE 11/04/2023 23:59    ISTAT8 VENOUS  POINT OF CARE   Result Value Ref Range    BUN - POINT OF CARE 7 6 - 20 mg/dL    SODIUM - POINT OF CARE 139 135 - 145 mmol/L    POTASSIUM - POINT OF CARE 4.1 3.4 - 5.1 mmol/L    CHLORIDE - POINT OF CARE 109 97 - 110 mmol/L    TCO2 - POINT OF CARE 19 19 - 24 mmol/L    ANION GAP - POINT OF CARE 15 7 - 19 mmol/L    HEMATOCRIT - POINT OF CARE 37.0 36.0 - 46.5 %    HEMOGLOBIN - POINT OF CARE 12.6 12.0 - 15.5 g/dL    GLUCOSE - POINT OF CARE 140 (H) 70 - 99 mg/dL    CALCIUM, IONIZED - POINT OF CARE 1.14 (L) 1.15 - 1.29 mmol/L    Creatinine 0.70 0.51 - 0.95 mg/dL    Glomerular Filtration Rate >90 >=60        IMAGING     Imaging Results          CT KNEE WO CONTRAST RIGHT (Final result)  Result time 11/01/23 21:20:04    Final result                 Impression:    IMPRESSION:    1.Impacted, comminuted extra-articular fracture of the proximal tibial  diaphysis is noted with medial displacement of the distal fracture fragment  by one third shaft  width and associated impaction. There is no extension to  either the interspinous region or the medial or lateral tibial plateaus.    There is also a comminuted minimally displaced fracture of the fibular  neck.      Electronically Signed by: Nathaniel Hurtado MD  Signed on: 11/1/2023 9:20 PM  Created on Workstation ID: [id number]  Signed on Workstation ID: [id number]             Narrative:    EXAMINATION:  CT KNEE WO CONTRAST RIGHT    TECHNIQUE: Contiguous axial non-contrast images of the knee were obtained  with reformats.    One or more of the following dose reduction techniques were used: automated  exposure control, adjustment of the mA and/or kV according to patient size,  use of iterative reconstruction.    CLINICAL HISTORY:  56 years old Female presents with fracture.    COMPARISON: None.     FINDINGS:    Impacted, comminuted extra-articular fracture of the proximal tibial  diaphysis is noted with medial displacement of the distal fracture fragment  by one third shaft width and associated impaction. There is no extension to  either the interspinous region or the medial or lateral tibial plateaus.    There is also a comminuted minimally displaced fracture of the fibular  neck.    Osteopenia is present.    Small joint effusion is present.    Advanced osteoarthritis of the lateral compartment.                                 CT HEAD WO CONTRAST (Final result)  Result time 11/01/23 21:16:10    Final result                 Impression:    IMPRESSION:    1. No evidence for acute intracranial hemorrhage, mass effect, midline  shift or acute process.       2. No evidence for fracture or traumatic listhesis in the cervical spine.        Electronically Signed by: Nathaniel Hurtado MD  Signed on: 11/1/2023 9:16 PM  Created on Workstation ID: [id number]  Signed on Workstation ID: [id number]             Narrative:    EXAMINATION: CT CERVICAL SPINE WO CONTRAST, CT HEAD WO CONTRAST    CLINICAL HISTORY:  56 years old Female fall,  syncope    TECHNIQUE: Contiguous axial noncontrast images of the head and cervical  spine were obtained with sagittal and coronal reformats. Dose reduction  technique was utilized.    COMPARISON: None.    FINDINGS:    CT HEAD:    There is no evidence for intracranial hemorrhage, mass effect, midline  shift or acute large vessel territory infarct.         There is no evidence of obstructive hydrocephalus.    No evidence for acute sinusitis or significant sinus disease.      The mastoid air cells are clear.    No evidence for calvarial fracture.    CERVICAL SPINE:    Vertebral body heights are maintained without evidence for fracture.    There is no evidence for traumatic listhesis, jumped facets or prevertebral  hematoma.    There is no evidence for high-grade osseous central canal stenosis.    The partially imaged lung apices are clear.                                 CT CERVICAL SPINE WO CONTRAST (Final result)  Result time 11/01/23 21:16:10    Final result                 Impression:    IMPRESSION:    1. No evidence for acute intracranial hemorrhage, mass effect, midline  shift or acute process.       2. No evidence for fracture or traumatic listhesis in the cervical spine.        Electronically Signed by: Nathaniel Hurtado MD  Signed on: 11/1/2023 9:16 PM  Created on Workstation ID: [id number]  Signed on Workstation ID: [id number]             Narrative:    EXAMINATION: CT CERVICAL SPINE WO CONTRAST, CT HEAD WO CONTRAST    CLINICAL HISTORY:  56 years old Female fall, syncope    TECHNIQUE: Contiguous axial noncontrast images of the head and cervical  spine were obtained with sagittal and coronal reformats. Dose reduction  technique was utilized.    COMPARISON: None.    FINDINGS:    CT HEAD:    There is no evidence for intracranial hemorrhage, mass effect, midline  shift or acute large vessel territory infarct.         There is no evidence of obstructive hydrocephalus.    No evidence for acute sinusitis or significant  sinus disease.      The mastoid air cells are clear.    No evidence for calvarial fracture.    CERVICAL SPINE:    Vertebral body heights are maintained without evidence for fracture.    There is no evidence for traumatic listhesis, jumped facets or prevertebral  hematoma.    There is no evidence for high-grade osseous central canal stenosis.    The partially imaged lung apices are clear.                                 XR TIBIA FIBULA 2 VIEWS RIGHT (Final result)  Result time 11/01/23 20:46:57    Final result                 Impression:    IMPRESSION:    There is a displaced fracture of the proximal tibial diaphysis with medial  displacement of the distal fracture fragment by one third shaft width. No  definitive extension to the medial or lateral tibial plateau.    There is also a mildly displaced fracture of the fibular neck/proximal  fibular diaphysis.        Electronically Signed by: Nathaniel Hurtado MD  Signed on: 11/1/2023 8:46 PM  Created on Workstation ID: [id number]  Signed on Workstation ID: [id number]             Narrative:    EXAM:  XR TIBIA FIBULA 2 VIEWS RIGHT    CLINICAL HISTORY:  56 years old Female presents with injury, deformity    COMPARISON: None    TECHNIQUE: XR TIBIA FIBULA 2 VIEWS RIGHT    FINDINGS and                              XR CHEST PA OR AP 1 VIEW (Final result)  Result time 11/01/23 20:34:14    Final result                 Impression:    IMPRESSION:    1. There is no evidence for pneumonia, pneumothorax, pulmonary edema or  pleural effusion.       2. There is no enlargement of the cardiac/pericardial silhouette.    3. No evidence for mass effect on the trachea.        Electronically Signed by: Nathaniel Hurtado MD  Signed on: 11/1/2023 8:34 PM  Created on Workstation ID: [id number]  Signed on Workstation ID: [id number]             Narrative:    EXAMINATION:  XR CHEST AP OR PA    TECHNIQUE: XR CHEST AP OR PA     CLINICAL HISTORY:  56 years old Female presents with  syncope     COMPARISON:  None.    FINDINGS and                                Radiology Review: I have independently interpreted the Chest X-Ray and have found No acute or active disease and Xray of the Right tib/fib and have found Fracture of proximal tib/fib.  I am awaiting on the final radiology read.           EKG AND PROCEDURES   Procedures     EKG Interpretation  Rate: 74  Rhythm: normal sinus rhythm   Abnormality: no    Per my review of the EKG tracing, my findings are listed above, awaiting confirmation from cardiology    ADMINISTERED MEDICATIONS   Medications   sodium chloride 0.9 % flush bag 25 mL ( Intravenous MAR Unhold 11/2/23 1715)   sodium chloride 0.9 % injection 2 mL (2 mLs Intracatheter Given 11/2/23 1855)   HYDROmorphone (DILAUDID) injection 0.5 mg ( Intravenous MAR Unhold 11/2/23 1715)   famotidine (PEPCID) tablet 20 mg ( Oral MAR Unhold 11/2/23 1715)   carvedilol (COREG) tablet 25 mg ( Oral MAR Unhold 11/2/23 1715)   albuterol inhaler 2 puff (2 puffs Inhalation Given 11/2/23 1732)   ondansetron (ZOFRAN) injection 4 mg (has no administration in time range)   prochlorperazine (COMPAZINE) injection 5 mg (has no administration in time range)   metoCLOPramide (REGLAN) injection 5 mg (has no administration in time range)   enoxaparin (LOVENOX) injection 40 mg (has no administration in time range)   ondansetron (ZOFRAN ODT) disintegrating tablet 4 mg (has no administration in time range)     Or   ondansetron (ZOFRAN) injection 4 mg (has no administration in time range)   HYDROcodone-acetaminophen (NORCO) 5-325 MG per tablet 1 tablet (has no administration in time range)   ceFAZolin (ANCEF) syringe 2,000 mg (2,000 mg Intravenous Given 11/2/23 1855)   diphenhydrAMINE (BENADRYL) capsule 25 mg (has no administration in time range)   polyethylene glycol (MIRALAX) packet 17 g (has no administration in time range)   calcium carbonate (TUMS) chewable tablet 1,000 mg (has no administration in time range)   dextrose 5 % / sodium  chloride 0.45% with KCl 20 mEq infusion ( Intravenous New Bag 11/2/23 1855)   acetaminophen (TYLENOL) tablet 1,000 mg (has no administration in time range)   sodium chloride (NORMAL SALINE) 0.9 % bolus 1,000 mL (0 mLs Intravenous Completed 11/1/23 2029)   HYDROmorphone (DILAUDID) injection 0.5 mg (0.5 mg Intravenous Given 11/1/23 1940)   ondansetron (ZOFRAN) injection 4 mg (4 mg Intravenous Given 11/1/23 1937)   HYDROmorphone (DILAUDID) injection 0.5 mg (0.5 mg Intravenous Given 11/1/23 2026)   HYDROmorphone (DILAUDID) injection 0.5 mg (0.5 mg Intravenous Given 11/1/23 2125)   oxyCODONE (IMM REL) (ROXICODONE) tablet 5 mg (5 mg Oral Given 11/2/23 1025)        MEDICAL DECISION MAKING AND DIFFERENTIAL     ED Consults done in the ED course    MEDICAL DECISION MAKING   Patient presents to the Emergency Department with a complaint of Syncope and Leg Pain   Multiple differential diagnoses considered on this patient. On exam, patient is anxious appearing, in pain when moving the right leg. Patient's lung sounds clear, heart sounds regular. Patient's right lower leg deformed, ecchymosis noted over area of deformity. PT pulse in the right leg heard with doppler.    Xray reveals fracture of the proximal tib/fib. Patient's chest xray reveals no acute findings. CT of the head and cervical spine pending at the time I transferred care. Discussed with Dr Matos who plans for surgery tomorrow. Requests CT of the right knee be ordered with particular attention to the fibula. Patient given fentanyl prior to arrival. 2 doses of dilaudid given in ED. Patient's pain under moderate control.     Labs reveal iron deficiency anemia. Patient's remaining labs unremarkable. Patient to be admitted to the hospitalist service. Care transferred to Dr Galeana.     Housing Stability  What is your housing situation today?: I have housing  Are you worried about losing your housing? : Yes     EMERGENCY DEPARTMENT TIMELINE   ED Course as of 11/02/23 1925    Wed Nov 01, 2023 1940 MONO: 20 [KR]   1959 Spoke with Dr Matos. Plans are for surgery tomorrow [KR]      ED Course User Index  [KR] Vanessa Fuentes APNP        CRITICAL CARE/SEPSIS   Does the Patient have sepsis: NO     No Critical Care    DIAGNOSTIC IMPRESSION      1. Syncope, unspecified syncope type    2. Closed fracture of tibia and fibula, unspecified laterality, initial encounter         DISPOSITION       Clinical Impression and Diagnosis  8:45 PM 11/02/23     Diagnosis:   1. Syncope, unspecified syncope type    2. Closed fracture of tibia and fibula, unspecified laterality, initial encounter           Pt to be admitted to Dr. Nelson     Condition on Admission: Stable          SIGNATURE DATE   SHANTELL Persaud 11/1/2023     [facility]        In the interest of transparency, the 21st Century Cures Act requires healthcare organizations to make nearly all medical information readily available to patients.  Please remember that this document is intended as a form of “oimy-sn-iasv” communication.  Often medical records contain verbiage and abbreviations that might be unfamiliar and without context.  Language may appear direct as it is intended to succinctly relay the clinical opinion of the practitioner.        Vanessa Fuentes APNP  11/02/23 1925

## 2024-09-23 NOTE — PROGRESS NOTES
Surgery Clinic Note    Malika Johnson is a 44 y.o. year old female in clinic today for follow up of robotic cholecystectomy. Doing great.  No f/c/n/v/sob/cp    ROS:  Negative except above    Pathology:  cholecystitis      PE:  Vitals:    09/23/24 1009   BP: 106/82   Pulse: 89       NAD  No belabored breathing  Abd soft nt nd  Incisions c/d/I    A/P:  Malika Johnson is a 44 y.o. year old female s/p robo marce    -rtc prn    Haider Mendoza  General Surgery - [facility]  9/23/2024

## (undated) DEVICE — PAD PINK TRENDELENBURG POS XL

## (undated) DEVICE — NDL ECLIPSE SAFETY 18GX1-1/2IN

## (undated) DEVICE — CHLORAPREP W TINT 26ML APPL

## (undated) DEVICE — GLOVE SIGNATURE ESSNTL LTX 7

## (undated) DEVICE — SUT MONOCRYL 4.0 PS2 CP496G

## (undated) DEVICE — PACK ENDOSCOPY GENERAL

## (undated) DEVICE — SEAL UNIVERSAL 5MM-8MM XI

## (undated) DEVICE — GOWN NONREINF SET-IN SLV XL

## (undated) DEVICE — APPLIER MEDIUM LARGE CLIP

## (undated) DEVICE — SCISSOR HOT SHEARS XI

## (undated) DEVICE — SOL CLEARIFY VISUALIZATION LAP

## (undated) DEVICE — ELECTRODE REM PLYHSV RETURN 9

## (undated) DEVICE — NDL HYPO REG 25G X 1 1/2

## (undated) DEVICE — KIT PROCEDURE STER INLET CLOSU

## (undated) DEVICE — NDL INSUF ULTRA VERESS 120MM

## (undated) DEVICE — GLOVE BIOGEL 7.0

## (undated) DEVICE — Device

## (undated) DEVICE — TUBING INSUFFLATION W/LUER LOK

## (undated) DEVICE — DRAPE THREE-QUARTER 53X77IN

## (undated) DEVICE — OBTURATOR BLADELESS 8MM XI CLR

## (undated) DEVICE — BLANKET UPPER BODY 78.7X29.9IN

## (undated) DEVICE — BAG TISSUE RETRIEVAL 5MM

## (undated) DEVICE — SUT VICRYL+ 27 UR-6 VIOL

## (undated) DEVICE — ADHESIVE DERMABOND MINI HV

## (undated) DEVICE — SOL IRR SOD CHL .9% POUR

## (undated) DEVICE — SYR 10CC LUER LOCK

## (undated) DEVICE — HOOK PERM MONOPOLAR CAUTERY

## (undated) DEVICE — DRAPE COLUMN DAVINCI XI

## (undated) DEVICE — FORCEP FENESTRATED BIPOLAR

## (undated) DEVICE — FORCEP CADIERE DA VINCI

## (undated) DEVICE — SUT ETHIBOND EXCEL 0 MO6 18

## (undated) DEVICE — COVER LIGHT HANDLE

## (undated) DEVICE — CONTAINER SPECIMEN 4OZ

## (undated) DEVICE — COVER TIP CURVED SCISSORS XI

## (undated) DEVICE — GLOVE SENSICARE PI GRN 7.5

## (undated) DEVICE — HEMOCLIPS GREEN

## (undated) DEVICE — DRAPE ARM DAVINCI XI

## (undated) DEVICE — IRRIGATOR ENDOWRIST XI SUCTION

## (undated) DEVICE — SYR ONLY LUER LOCK 20CC

## (undated) DEVICE — SOL ELECTROLUBE ANTI-STIC